# Patient Record
Sex: MALE | Race: WHITE | Employment: FULL TIME | URBAN - METROPOLITAN AREA
[De-identification: names, ages, dates, MRNs, and addresses within clinical notes are randomized per-mention and may not be internally consistent; named-entity substitution may affect disease eponyms.]

---

## 2015-06-02 LAB — COLONOSCOPY, EXTERNAL: NORMAL

## 2017-02-07 ENCOUNTER — HOSPITAL ENCOUNTER (OUTPATIENT)
Dept: LAB | Age: 52
Discharge: HOME | End: 2017-02-07
Payer: COMMERCIAL

## 2017-02-07 ENCOUNTER — OFFICE VISIT (OUTPATIENT)
Dept: INTERNAL MEDICINE CLINIC | Age: 52
End: 2017-02-07

## 2017-02-07 VITALS
WEIGHT: 195.8 LBS | DIASTOLIC BLOOD PRESSURE: 76 MMHG | BODY MASS INDEX: 29.67 KG/M2 | TEMPERATURE: 96.3 F | SYSTOLIC BLOOD PRESSURE: 137 MMHG | RESPIRATION RATE: 16 BRPM | HEART RATE: 71 BPM | HEIGHT: 68 IN | OXYGEN SATURATION: 100 %

## 2017-02-07 DIAGNOSIS — E55.9 VITAMIN D DEFICIENCY: ICD-10-CM

## 2017-02-07 DIAGNOSIS — Z79.899 ENCOUNTER FOR MONITORING DIGOXIN THERAPY: ICD-10-CM

## 2017-02-07 DIAGNOSIS — E11.9 DIABETES MELLITUS, STABLE (HCC): ICD-10-CM

## 2017-02-07 DIAGNOSIS — Z51.81 ENCOUNTER FOR MONITORING DIGOXIN THERAPY: ICD-10-CM

## 2017-02-07 DIAGNOSIS — E11.9 DIABETES MELLITUS, STABLE (HCC): Primary | ICD-10-CM

## 2017-02-07 DIAGNOSIS — I48.91 ATRIAL FIBRILLATION, UNSPECIFIED TYPE (HCC): ICD-10-CM

## 2017-02-07 LAB
25(OH)D3 SERPL-MCNC: 28 NG/ML (ref 30–100)
ALBUMIN SERPL BCP-MCNC: 3.9 G/DL (ref 3.4–5)
ALBUMIN/GLOB SERPL: 1.2 {RATIO} (ref 0.8–1.7)
ALP SERPL-CCNC: 115 U/L (ref 45–117)
ALT SERPL-CCNC: 22 U/L (ref 16–61)
ANION GAP BLD CALC-SCNC: 9 MMOL/L (ref 3–18)
AST SERPL W P-5'-P-CCNC: 12 U/L (ref 15–37)
BILIRUB SERPL-MCNC: 0.5 MG/DL (ref 0.2–1)
BUN SERPL-MCNC: 23 MG/DL (ref 7–18)
BUN/CREAT SERPL: 19 (ref 12–20)
CALCIUM SERPL-MCNC: 9.2 MG/DL (ref 8.5–10.1)
CHLORIDE SERPL-SCNC: 103 MMOL/L (ref 100–108)
CHOLEST SERPL-MCNC: 140 MG/DL
CO2 SERPL-SCNC: 28 MMOL/L (ref 21–32)
CREAT SERPL-MCNC: 1.21 MG/DL (ref 0.6–1.3)
DIGOXIN SERPL-MCNC: 1.3 NG/ML (ref 0.9–2)
EST. AVERAGE GLUCOSE BLD GHB EST-MCNC: 120 MG/DL
GLOBULIN SER CALC-MCNC: 3.3 G/DL (ref 2–4)
GLUCOSE SERPL-MCNC: 145 MG/DL (ref 74–99)
HBA1C MFR BLD: 5.8 % (ref 4.2–5.6)
HDLC SERPL-MCNC: 47 MG/DL (ref 40–60)
HDLC SERPL: 3 {RATIO} (ref 0–5)
LDLC SERPL CALC-MCNC: 82.6 MG/DL (ref 0–100)
LIPID PROFILE,FLP: NORMAL
POTASSIUM SERPL-SCNC: 4.4 MMOL/L (ref 3.5–5.5)
PROT SERPL-MCNC: 7.2 G/DL (ref 6.4–8.2)
SODIUM SERPL-SCNC: 140 MMOL/L (ref 136–145)
TRIGL SERPL-MCNC: 52 MG/DL (ref ?–150)
VLDLC SERPL CALC-MCNC: 10.4 MG/DL

## 2017-02-07 PROCEDURE — 82306 VITAMIN D 25 HYDROXY: CPT | Performed by: INTERNAL MEDICINE

## 2017-02-07 PROCEDURE — 36415 COLL VENOUS BLD VENIPUNCTURE: CPT | Performed by: INTERNAL MEDICINE

## 2017-02-07 PROCEDURE — 83036 HEMOGLOBIN GLYCOSYLATED A1C: CPT | Performed by: INTERNAL MEDICINE

## 2017-02-07 PROCEDURE — 80162 ASSAY OF DIGOXIN TOTAL: CPT | Performed by: INTERNAL MEDICINE

## 2017-02-07 PROCEDURE — 80053 COMPREHEN METABOLIC PANEL: CPT | Performed by: INTERNAL MEDICINE

## 2017-02-07 PROCEDURE — 80061 LIPID PANEL: CPT | Performed by: INTERNAL MEDICINE

## 2017-02-07 NOTE — PROGRESS NOTES
ROOM # 54 Paige Hampton presents today for   Chief Complaint   Patient presents with    Diabetes     f/u       Julian Hamilton preferred language for health care discussion is english/other. Is someone accompanying this pt? no    Is the patient using any DME equipment during [address]? no    Depression Screening:  PHQ 2 / 9, over the last two weeks 2/7/2017 6/30/2016   Little interest or pleasure in doing things Not at all Not at all   Feeling down, depressed or hopeless Not at all Not at all   Total Score PHQ 2 0 0       Learning Assessment:  Learning Assessment 6/30/2016   PRIMARY LEARNER Patient   PRIMARY LANGUAGE ENGLISH   LEARNER PREFERENCE PRIMARY DEMONSTRATION     READING   ANSWERED BY patient   RELATIONSHIP SELF       Abuse Screening:  No flowsheet data found. Fall Risk  No flowsheet data found. Health Maintenance reviewed and discussed per provider. Yes    Julian Hamilton is due for eye exam.  Please order/place referral if appropriate. Advance Directive:  1. Do you have an advance directive in place? Patient Reply: no    2. If not, would you like material regarding how to put one in place? Patient Reply: no    Coordination of Care:  1. Have you been to the ER, urgent care clinic since your last visit? Hospitalized since your last visit? no    2. Have you seen or consulted any other health care providers outside of the [address] [place] [address] since your last visit? Include any pap smears or colon screening.  no

## 2017-02-07 NOTE — PROGRESS NOTES
Chief Complaint   Patient presents with    Diabetes     f/u       HPI:     Elvira Grant is a 46 y.o.  male with history of atrial fibrillation and type 2 DM   here for the above complaint. He has not been checking his sugars because he is taking his medication. Told him to check at least once a week. He denies any chest pain, shortness of breath, abdominal pain, headaches or dizziness. Past Medical History   Diagnosis Date    [facility]     Diabetes mellitus ([facility]     H/O ETOH abuse     [facility]      from DM    Vitamin D deficiency 7/2016     Past Surgical History   Procedure Laterality Date    Hx nephrectomy Left 05/2016     Dr. Isa Shearer     Current Outpatient Prescriptions   Medication Sig    metFORMIN (GLUCOPHAGE) 1,000 mg tablet take 1 tablet by mouth twice a day after meals    metoprolol tartrate (LOPRESSOR) 25 mg tablet TAKR 1/2 TABLETS BY MOUTH TWICE A DAY    digoxin (DIGITEK) 0.125 mg tablet Take 0.125 mg by mouth daily.  omega-3 fatty acids cap Take 500 mg by mouth daily.  OTHER [facility] vitamins daily    Blood-Glucose Meter (FREESTYLE LITE METER) monitoring kit Check fasting before breakfast and dinner.  Lancets misc Check fasting before breakfast and dinner.  glucose blood VI test strips (FREESTYLE LITE STRIPS) strip Check fasting before breakfast and dinner. No current facility-administered medications for this visit.       Health Maintenance   Topic Date Due    EYE EXAM RETINAL OR DILATED Q1  03/12/1973    HEMOGLOBIN A1C Q6M  05/07/2017    MICROALBUMIN Q1  07/12/2017    FOOT EXAM Q1  11/07/2017    LIPID PANEL Q1  11/07/2017    FOBT Q 1 YEAR AGE 50-75  11/07/2017    DTaP/Tdap/Td series (2 - Td) 07/21/2026    Hepatitis C Screening  Addressed    Pneumococcal 19-64 Medium Risk  Addressed    INFLUENZA AGE 9 TO ADULT  Addressed     Immunization History   Administered Date(s) Administered    Tdap 07/21/2016     No LMP for male patient. Allergies and Intolerances: Allergies   Allergen Reactions    Pcn [Penicillins] Unable to Obtain     child       Family History:   Family History   Problem Relation Age of Onset    Cancer Mother      lung cancer    Cancer Father      brain and liver ca       Social History:   He  reports that he has never smoked. He has never used smokeless tobacco.  He  reports that he drinks alcohol. Objective:   Physical exam:   Visit Vitals    /76 (BP 1 Location: Left arm, BP Patient Position: Sitting)    Pulse 71    Temp 96.3 °F (35.7 °C) (Oral)    Resp 16    Ht 5' 8\" (1.727 m)    Wt 195 lb 12.8 oz (88.8 kg)    SpO2 100%    BMI 29.77 kg/m2        Generally: Pleasant male in no acute distress  Cardiac Exam: regular, rate, and rhythm. Normal S1 and S2. No murmurs, gallops, or rubs  Pulmonary exam: Clear to auscultation bilaterally  Abdominal exam: Positive bowel sounds in all four quadrants, soft, nondistended, nontender  Extremities: 2+ dorsalis pedis pulses bilaterally. No pedal edema    bilaterally    LABS/Radiological Tests:  Lab Results   Component Value Date/Time    WBC 5.9 07/12/2016 08:19 AM    HGB 11.3 07/12/2016 08:19 AM    HCT 35.9 07/12/2016 08:19 AM    PLATELET 407 82/64/0571 08:19 AM     Lab Results   Component Value Date/Time    Sodium 140 11/07/2016 08:12 AM    Potassium 4.7 11/07/2016 08:12 AM    Chloride 103 11/07/2016 08:12 AM    CO2 27 11/07/2016 08:12 AM    Glucose 113 11/07/2016 08:12 AM    BUN 18 11/07/2016 08:12 AM    Creatinine 1.19 11/07/2016 08:12 AM     Lab Results   Component Value Date/Time    Cholesterol, total 160 11/07/2016 08:12 AM    HDL Cholesterol 47 11/07/2016 08:12 AM    LDL, calculated 97.6 11/07/2016 08:12 AM    Triglyceride 77 11/07/2016 08:12 AM     No results found for: GPT    Previous labs      ASSESSMENT/PLAN:    1. Diabetes mellitus, stable ([facility]): we will see what the labs show. Continue the metformin, diet and exercise.    - METABOLIC PANEL, COMPREHENSIVE; Future  -     LIPID PANEL; Future  -     HEMOGLOBIN A1C WITH EAG; Future    2. Vitamin D deficiency  -     VITAMIN D, 25 HYDROXY; Future    3. Encounter for monitoring digoxin therapy  -     DIGOXIN; Future    4. Atrial fibrillation, unspecified type ([facility] stable. Continue the digoxin and metoprolol, diet and exercise. 5. Patient verbalized understanding and agreement with the plan. 6. Patient was given an after-visit summary. 7.   Follow-up Disposition:  Return in about 4 months (around 6/7/2017) for f/u DM. or sooner if worsening symptoms.                 Wilda Kimball MD

## 2017-02-07 NOTE — MR AVS SNAPSHOT
Visit Information Date & Time Provider Department Dept. Phone Encounter #  
 2/7/2017  7:30 AM Rehan Galeano MD [facility] [phone] [id number] Follow-up Instructions Return in about 4 months (around 6/7/2017) for f/u DM. Upcoming Health Maintenance Date Due  
 EYE EXAM RETINAL OR DILATED Q1 3/12/1973 HEMOGLOBIN A1C Q6M 5/7/2017 MICROALBUMIN Q1 7/12/2017 FOOT EXAM Q1 11/7/2017 LIPID PANEL Q1 11/7/2017 FOBT Q 1 YEAR AGE 50-75 11/7/2017 DTaP/Tdap/Td series (2 - Td) 7/21/2026 Allergies as of 2/7/2017  Review Complete On: 2/7/2017 By: Rehan Galeano MD  
  
 Severity Noted Reaction Type Reactions Pcn [Penicillins]  06/15/2010    Unable to Obtain  
 child Current Immunizations  Reviewed on 7/21/2016 Name Date Tdap 7/21/2016  3:12 PM  
  
 Not reviewed this visit You Were Diagnosed With   
  
 Codes Comments Diabetes mellitus, stable ([facility]    -  Primary ICD-10-CM: E11.9 ICD-9-CM: 250.00 Vitamin D deficiency     ICD-10-CM: E55.9 ICD-9-CM: 268.9 Encounter for monitoring digoxin therapy     ICD-10-CM: Z51.81, Z79.899 ICD-9-CM: V58.83, V58.69 Vitals BP Pulse Temp Resp Height(growth percentile) Weight(growth percentile)  
 137/76 (BP 1 Location: Left arm, BP Patient Position: Sitting) 71 96.3 °F (35.7 °C) (Oral) 16 5' 8\" (1.727 m) 195 lb 12.8 oz (88.8 kg) SpO2 BMI Smoking Status 100% 29.77 kg/m2 Never Smoker Vitals History BMI and BSA Data Body Mass Index Body Surface Area  
 29.77 kg/m 2 2.06 m 2 Preferred Pharmacy Pharmacy Name Phone [facility] [address]  [facility] [phone] Your Updated Medication List  
  
   
This list is accurate as of: 2/7/17  8:31 AM.  Always use your most recent med list.  
  
  
  
  
 Blood-Glucose Meter monitoring kit Commonly known as:  FREESTYLE LITE METER  
 Check fasting before breakfast and dinner. DIGITEK 0.125 mg tablet Generic drug:  digoxin Take 0.125 mg by mouth daily. glucose blood VI test strips strip Commonly known as:  FREESTYLE LITE STRIPS Check fasting before breakfast and dinner. Lancets Misc Check fasting before breakfast and dinner. metFORMIN 1,000 mg tablet Commonly known as:  GLUCOPHAGE  
take 1 tablet by mouth twice a day after meals  
  
 metoprolol tartrate 25 mg tablet Commonly known as:  LOPRESSOR  
TAKR 1/2 TABLETS BY MOUTH TWICE A DAY  
  
 omega-3 fatty acids Cap Take 500 mg by mouth daily. OTHER  
shakley vitamins daily Follow-up Instructions Return in about 4 months (around 6/7/2017) for f/u DM. To-Do List   
 02/07/2017 Lab:  DIGOXIN   
  
 02/07/2017 Lab:  HEMOGLOBIN A1C WITH EAG   
  
 02/07/2017 Lab:  LIPID PANEL   
  
 02/07/2017 Lab:  METABOLIC PANEL, COMPREHENSIVE   
  
 02/07/2017 Lab:  VITAMIN D, 25 HYDROXY Patient Instructions 1) check fasting sugars once a week (prior to breakfast) 2) follow-up in 4 months or sooner if worsening symptoms. Introducing [facility]! [employer] introduces [employer] patient portal. Now you can access parts of your medical record, email your doctor's office, and request medication refills online. 1. In your internet browser, go to [email or web address] 2. Click on the First Time User? Click Here link in the Sign In box. You will see the New Member Sign Up page. 3. Enter your [employer] Access Code exactly as it appears below. You will not need to use this code after youve completed the sign-up process. If you do not sign up before the expiration date, you must request a new code. · [employer] Access Code: [email or web address] Expires: 5/7/2017  8:40 AM 
 
4.  Enter the last four digits of your Social Security Number (xxxx) and Date of Birth (mm/dd/yyyy) as indicated and click Submit. You will be taken to the next sign-up page. 5. Create a bOombate ID. This will be your bO[employer]ate login ID and cannot be changed, so think of one that is secure and easy to remember. 6. Create a bOombate password. You can change your password at any time. 7. Enter your Password Reset Question and Answer. This can be used at a later time if you forget your password. 8. Enter your e-mail address. You will receive e-mail notification when new information is available in [address] 9. Click Sign Up. You can now view and download portions of your medical record. 10. Click the Download Summary menu link to download a portable copy of your medical information. If you have questions, please visit the Frequently Asked Questions section of the [employer] website. Remember, [employer] is NOT to be used for urgent needs. For medical emergencies, dial 911. Now available from your iPhone and Android! Please provide this summary of care documentation to your next provider. Your primary care clinician is listed as Mike Falcon. If you have any questions after today's visit, please call [phone].

## 2017-02-07 NOTE — PATIENT INSTRUCTIONS
1) check fasting sugars once a week (prior to breakfast)    2) follow-up in 4 months or sooner if worsening symptoms.

## 2017-05-01 ENCOUNTER — TELEPHONE (OUTPATIENT)
Dept: INTERNAL MEDICINE CLINIC | Age: 52
End: 2017-05-01

## 2017-05-01 DIAGNOSIS — E11.9 DIABETES MELLITUS, STABLE (HCC): Primary | ICD-10-CM

## 2017-05-01 NOTE — TELEPHONE ENCOUNTER
Is this for DM foot exam ? Or is he having problems with his foot? If issues with his foot, which one.

## 2017-05-03 NOTE — TELEPHONE ENCOUNTER
Attempted to contact pt at  number, no answer. m for pt to return call to office at [phone]. Will continue to try to contact pt.

## 2017-05-04 DIAGNOSIS — T14.8XXA BLOOD BLISTER: Primary | ICD-10-CM

## 2017-05-04 NOTE — TELEPHONE ENCOUNTER
2 pt. Identifiers confirmed. S/w pt. about below. He states he had a blood blister on ball right foot that had healed but reopened w/o infection. He scheduled his own aptmt and was seen at [facility] yesterday. No other questions/concerns at this time.

## 2017-08-07 DIAGNOSIS — E11.9 DIABETES MELLITUS, STABLE (HCC): ICD-10-CM

## 2017-08-07 RX ORDER — LANCETS
EACH MISCELLANEOUS
Qty: 100 EACH | Refills: 11 | Status: SHIPPED | OUTPATIENT
Start: 2017-08-07

## 2017-08-07 NOTE — TELEPHONE ENCOUNTER
Requested Prescriptions     Pending Prescriptions Disp Refills    glucose blood VI test strips (FREESTYLE LITE STRIPS) strip 100 Strip 11     Sig: Check fasting before breakfast and dinner.  Lancets misc 100 Each 11     Sig: Check fasting before breakfast and dinner.

## 2017-08-14 ENCOUNTER — TELEPHONE (OUTPATIENT)
Dept: INTERNAL MEDICINE CLINIC | Age: 52
End: 2017-08-14

## 2017-08-14 ENCOUNTER — HOSPITAL ENCOUNTER (OUTPATIENT)
Dept: LAB | Age: 52
Discharge: HOME | End: 2017-08-14
Payer: COMMERCIAL

## 2017-08-14 ENCOUNTER — OFFICE VISIT (OUTPATIENT)
Dept: INTERNAL MEDICINE CLINIC | Age: 52
End: 2017-08-14

## 2017-08-14 VITALS
BODY MASS INDEX: 30.01 KG/M2 | HEIGHT: 68 IN | TEMPERATURE: 97.6 F | HEART RATE: 64 BPM | RESPIRATION RATE: 18 BRPM | DIASTOLIC BLOOD PRESSURE: 76 MMHG | WEIGHT: 198 LBS | SYSTOLIC BLOOD PRESSURE: 131 MMHG | OXYGEN SATURATION: 99 %

## 2017-08-14 DIAGNOSIS — N39.0 URINARY TRACT INFECTION WITHOUT HEMATURIA, SITE UNSPECIFIED: Primary | ICD-10-CM

## 2017-08-14 DIAGNOSIS — Z79.899 ENCOUNTER FOR MONITORING DIGOXIN THERAPY: ICD-10-CM

## 2017-08-14 DIAGNOSIS — Z12.5 SCREENING PSA (PROSTATE SPECIFIC ANTIGEN): ICD-10-CM

## 2017-08-14 DIAGNOSIS — Z51.81 ENCOUNTER FOR MONITORING DIGOXIN THERAPY: ICD-10-CM

## 2017-08-14 DIAGNOSIS — I48.91 ATRIAL FIBRILLATION, UNSPECIFIED TYPE (HCC): ICD-10-CM

## 2017-08-14 DIAGNOSIS — I10 BENIGN HYPERTENSION WITHOUT CHF: ICD-10-CM

## 2017-08-14 DIAGNOSIS — E55.9 VITAMIN D DEFICIENCY: ICD-10-CM

## 2017-08-14 DIAGNOSIS — E11.9 DIABETES MELLITUS, STABLE (HCC): ICD-10-CM

## 2017-08-14 DIAGNOSIS — E11.9 DIABETES MELLITUS, STABLE (HCC): Primary | ICD-10-CM

## 2017-08-14 LAB
25(OH)D3 SERPL-MCNC: 30.2 NG/ML (ref 30–100)
ALBUMIN SERPL BCP-MCNC: 3.8 G/DL (ref 3.4–5)
ALBUMIN/GLOB SERPL: 1.1 {RATIO} (ref 0.8–1.7)
ALP SERPL-CCNC: 103 U/L (ref 45–117)
ALT SERPL-CCNC: 14 U/L (ref 16–61)
ANION GAP BLD CALC-SCNC: 7 MMOL/L (ref 3–18)
APPEARANCE UR: CLEAR
AST SERPL W P-5'-P-CCNC: 8 U/L (ref 15–37)
BACTERIA URNS QL MICRO: ABNORMAL /HPF
BILIRUB SERPL-MCNC: 0.4 MG/DL (ref 0.2–1)
BILIRUB UR QL: NEGATIVE
BUN SERPL-MCNC: 21 MG/DL (ref 7–18)
BUN/CREAT SERPL: 18 (ref 12–20)
CALCIUM SERPL-MCNC: 8.7 MG/DL (ref 8.5–10.1)
CHLORIDE SERPL-SCNC: 102 MMOL/L (ref 100–108)
CHOLEST SERPL-MCNC: 152 MG/DL
CO2 SERPL-SCNC: 28 MMOL/L (ref 21–32)
COLOR UR: YELLOW
CREAT SERPL-MCNC: 1.16 MG/DL (ref 0.6–1.3)
CREAT UR-MCNC: 87.29 MG/DL (ref 30–125)
DIGOXIN SERPL-MCNC: 1.6 NG/ML (ref 0.9–2)
EPITH CASTS URNS QL MICRO: ABNORMAL /LPF (ref 0–5)
ERYTHROCYTE [DISTWIDTH] IN BLOOD BY AUTOMATED COUNT: 13.8 % (ref 11.6–14.5)
GLOBULIN SER CALC-MCNC: 3.6 G/DL (ref 2–4)
GLUCOSE SERPL-MCNC: 120 MG/DL (ref 74–99)
GLUCOSE UR STRIP.AUTO-MCNC: NEGATIVE MG/DL
HBA1C MFR BLD: 5.9 % (ref 4.2–5.6)
HCT VFR BLD AUTO: 40.4 % (ref 36–48)
HDLC SERPL-MCNC: 43 MG/DL (ref 40–60)
HDLC SERPL: 3.5 {RATIO} (ref 0–5)
HGB BLD-MCNC: 13.1 G/DL (ref 13–16)
HGB UR QL STRIP: NEGATIVE
KETONES UR QL STRIP.AUTO: NEGATIVE MG/DL
LDLC SERPL CALC-MCNC: 97 MG/DL (ref 0–100)
LEUKOCYTE ESTERASE UR QL STRIP.AUTO: ABNORMAL
LIPID PROFILE,FLP: NORMAL
MCH RBC QN AUTO: 29.3 PG (ref 24–34)
MCHC RBC AUTO-ENTMCNC: 32.4 G/DL (ref 31–37)
MCV RBC AUTO: 90.4 FL (ref 74–97)
MICROALBUMIN UR-MCNC: 3.2 MG/DL (ref 0–3)
MICROALBUMIN/CREAT UR-RTO: 37 MG/G (ref 0–30)
NITRITE UR QL STRIP.AUTO: POSITIVE
PH UR STRIP: 5.5 [PH] (ref 5–8)
PLATELET # BLD AUTO: 197 K/UL (ref 135–420)
PMV BLD AUTO: 11 FL (ref 9.2–11.8)
POTASSIUM SERPL-SCNC: 4.7 MMOL/L (ref 3.5–5.5)
PROT SERPL-MCNC: 7.4 G/DL (ref 6.4–8.2)
PROT UR STRIP-MCNC: NEGATIVE MG/DL
PSA SERPL-MCNC: 0.9 NG/ML (ref 0–4)
RBC # BLD AUTO: 4.47 M/UL (ref 4.7–5.5)
RBC #/AREA URNS HPF: 0 /HPF (ref 0–5)
SODIUM SERPL-SCNC: 137 MMOL/L (ref 136–145)
SP GR UR REFRACTOMETRY: 1.02 (ref 1–1.03)
TRIGL SERPL-MCNC: 60 MG/DL (ref ?–150)
TSH SERPL DL<=0.05 MIU/L-ACNC: 2.69 UIU/ML (ref 0.36–3.74)
UROBILINOGEN UR QL STRIP.AUTO: 0.2 EU/DL (ref 0.2–1)
VLDLC SERPL CALC-MCNC: 12 MG/DL
WBC # BLD AUTO: 7 K/UL (ref 4.6–13.2)
WBC URNS QL MICRO: ABNORMAL /HPF (ref 0–4)

## 2017-08-14 PROCEDURE — 85027 COMPLETE CBC AUTOMATED: CPT | Performed by: INTERNAL MEDICINE

## 2017-08-14 PROCEDURE — 80061 LIPID PANEL: CPT | Performed by: INTERNAL MEDICINE

## 2017-08-14 PROCEDURE — 80162 ASSAY OF DIGOXIN TOTAL: CPT | Performed by: INTERNAL MEDICINE

## 2017-08-14 PROCEDURE — 83036 HEMOGLOBIN GLYCOSYLATED A1C: CPT | Performed by: INTERNAL MEDICINE

## 2017-08-14 PROCEDURE — 82043 UR ALBUMIN QUANTITATIVE: CPT | Performed by: INTERNAL MEDICINE

## 2017-08-14 PROCEDURE — 81001 URINALYSIS AUTO W/SCOPE: CPT | Performed by: INTERNAL MEDICINE

## 2017-08-14 PROCEDURE — 36415 COLL VENOUS BLD VENIPUNCTURE: CPT | Performed by: INTERNAL MEDICINE

## 2017-08-14 PROCEDURE — 84443 ASSAY THYROID STIM HORMONE: CPT | Performed by: INTERNAL MEDICINE

## 2017-08-14 PROCEDURE — 82306 VITAMIN D 25 HYDROXY: CPT | Performed by: INTERNAL MEDICINE

## 2017-08-14 PROCEDURE — 84153 ASSAY OF PSA TOTAL: CPT | Performed by: INTERNAL MEDICINE

## 2017-08-14 PROCEDURE — 80053 COMPREHEN METABOLIC PANEL: CPT | Performed by: INTERNAL MEDICINE

## 2017-08-14 RX ORDER — METFORMIN HYDROCHLORIDE 1000 MG/1
TABLET ORAL
Qty: 180 TAB | Refills: 3 | Status: SHIPPED | OUTPATIENT
Start: 2017-08-14 | End: 2018-04-23 | Stop reason: SDUPTHER

## 2017-08-14 RX ORDER — IODOQUINOL, HYDROCORTISONE ACETATE AND ALOE VERA LEAF 10; 20; 10 MG/G; MG/G; MG/G
GEL TOPICAL
Refills: 2 | COMMUNITY
Start: 2017-07-05 | End: 2017-08-14

## 2017-08-14 RX ORDER — DIGOXIN 125 MCG
0.12 TABLET ORAL DAILY
Qty: 90 TAB | Refills: 3 | Status: SHIPPED | OUTPATIENT
Start: 2017-08-14 | End: 2018-09-10 | Stop reason: SDUPTHER

## 2017-08-14 RX ORDER — CIPROFLOXACIN 500 MG/1
500 TABLET ORAL 2 TIMES DAILY
Qty: 20 TAB | Refills: 0 | Status: SHIPPED | OUTPATIENT
Start: 2017-08-14 | End: 2017-08-24

## 2017-08-14 RX ORDER — METOPROLOL TARTRATE 25 MG/1
TABLET, FILM COATED ORAL
Qty: 90 TAB | Refills: 3 | Status: SHIPPED | OUTPATIENT
Start: 2017-08-14 | End: 2018-09-11 | Stop reason: SDUPTHER

## 2017-08-14 NOTE — TELEPHONE ENCOUNTER
2 pt. Identifiers confirmed. Pt. Stated he received a call. No documented reason for call. Call was not from provider and pt. Denies any questions/concerns at this time. This encounter to be closed.

## 2017-08-14 NOTE — MR AVS SNAPSHOT
Visit Information Date & Time Provider Department Dept. Phone Encounter #  
 8/14/2017  8:30 AM Yolanda Wong MD [address] [place] [address] [phone] [id number] Follow-up Instructions Return in about 6 months (around 2/14/2018) for f/u DM/HTN. Upcoming Health Maintenance Date Due INFLUENZA AGE 9 TO ADULT 9/15/2017* EYE EXAM RETINAL OR DILATED Q1 7/20/2018* FOBT Q 1 YEAR AGE 50-75 11/7/2017 LIPID PANEL Q1 2/7/2018 HEMOGLOBIN A1C Q6M 2/14/2018 FOOT EXAM Q1 5/23/2018 MICROALBUMIN Q1 8/14/2018 DTaP/Tdap/Td series (2 - Td) 7/21/2026 *Topic was postponed. The date shown is not the original due date. Allergies as of 8/14/2017  Review Complete On: 8/14/2017 By: Yolanda Wong MD  
  
 Severity Noted Reaction Type Reactions Pcn [Penicillins]  06/15/2010    Unable to Obtain  
 child Current Immunizations  Reviewed on 7/21/2016 Name Date Tdap 7/21/2016  3:12 PM  
  
 Not reviewed this visit You Were Diagnosed With   
  
 Codes Comments Diabetes mellitus, stable ([facility]    -  Primary ICD-10-CM: E11.9 ICD-9-CM: 250.00 Benign hypertension without CHF     ICD-10-CM: I10 
ICD-9-CM: 401.1 Atrial fibrillation, unspecified type ([facility]     ICD-10-CM: I48.91 
ICD-9-CM: 427.31 Vitamin D deficiency     ICD-10-CM: E55.9 ICD-9-CM: 268.9 Screening PSA (prostate specific antigen)     ICD-10-CM: Z12.5 ICD-9-CM: V76.44 Encounter for monitoring digoxin therapy     ICD-10-CM: Z51.81, Z79.899 ICD-9-CM: V58.83, V58.69 Vitals BP Pulse Temp Resp Height(growth percentile) Weight(growth percentile) 131/76 (BP 1 Location: Left arm, BP Patient Position: Sitting) 64 97.6 °F (36.4 °C) (Oral) 18 5' 8\" (1.727 m) 198 lb (89.8 kg) SpO2 BMI Smoking Status 99% 30.11 kg/m2 Never Smoker Vitals History BMI and BSA Data  Body Mass Index Body Surface Area  
 30.11 kg/m 2 2.08 m 2  
  
  
 Preferred Pharmacy Pharmacy Name Phone [facility] Ascenc[facility] [address] [phone] Your Updated Medication List  
  
   
This list is accurate as of: 8/14/17  9:07 AM.  Always use your most recent med list.  
  
  
  
  
 digoxin 0.125 mg tablet Commonly known as:  Carnella [place] Take 1 Tab by mouth daily. glucose blood VI test strips strip Commonly known as:  ASCENSIA AUTODISC VI, ONE TOUCH ULTRA TEST VI Check fasting sugars before breakfast and dinner. One Touch Ultra mini meter Lancets Misc Check fasting before breakfast and dinner. metFORMIN 1,000 mg tablet Commonly known as:  GLUCOPHAGE  
take 1 tablet by mouth twice a day AFTER MEALS  
  
 metoprolol tartrate 25 mg tablet Commonly known as:  LOPRESSOR  
TAKE 1/2 TABLETS BY MOUTH TWICE A DAY  
  
 ONE TOUCH ULTRASMART SYSTEM  
by Does Not Apply route. OTHER  
shakley vitamins daily Prescriptions Sent to Pharmacy Refills  
 metoprolol tartrate (LOPRESSOR) 25 mg tablet 3 Sig: TAKE 1/2 TABLETS BY MOUTH TWICE A DAY Class: Normal  
 Pharmacy: [address] Ph #: [phone]  
 metFORMIN (GLUCOPHAGE) 1,000 mg tablet 3 Sig: take 1 tablet by mouth twice a day AFTER MEALS Class: Normal  
 Pharmacy: [address] Ph #: [phone]  
 digoxin (DIGITEK) 0.125 mg tablet 3 Sig: Take 1 Tab by mouth daily. Class: Normal  
 Pharmacy: [address] Ph #: [phone] Route: Oral  
 glucose blood VI test strips (ASCENSIA AUTODISC VI, ONE TOUCH ULTRA TEST VI) strip 11 Sig: Check fasting sugars before breakfast and dinner. One Touch Ultra mini meter Class: Normal  
 Pharmacy: [address] Ph #: [phone] Follow-up Instructions Return in about 6 months (around 2/14/2018) for f/u DM/HTN. To-Do List   
 08/14/2017 Lab:  CBC W/O DIFF   
  
 08/14/2017 Lab:  DIGOXIN   
  
 08/14/2017 Lab:  HEMOGLOBIN A1C W/O EAG   
  
 08/14/2017 Lab:  LIPID PANEL   
  
 08/14/2017 Lab:  METABOLIC PANEL, COMPREHENSIVE   
  
 08/14/2017 Lab:  MICROALBUMIN, UR, RAND W/ MICROALBUMIN/CREA RATIO   
  
 08/14/2017 Lab:  PSA, DIAGNOSTIC (PROSTATE SPECIFIC AG)   
  
 08/14/2017 Lab:  TSH 3RD GENERATION   
  
 08/14/2017 Lab:  URINALYSIS W/ RFLX MICROSCOPIC   
  
 08/14/2017 Lab:  VITAMIN D, 25 HYDROXY Patient Instructions 1) follow-up in 6 months or sooner if worsening symptoms. Introducing hospitals & [facility]! [employer] introduces "[employer] patient portal. Now you can access parts of your medical record, email your doctor's office, and request medication refills online. 1. In your internet browser, go to [email or web address] 2. Click on the First Time User? Click Here link in the Sign In box. You will see the New Member Sign Up page. 3. Enter your "[employer] Access Code exactly as it appears below. You will not need to use this code after youve completed the sign-up process. If you do not sign up before the expiration date, you must request a new code. · "[employer] Access Code: [email or web address] Expires: 11/12/2017  8:58 AM 
 
4. Enter the last four digits of your Social Security Number (xxxx) and Date of Birth (mm/dd/yyyy) as indicated and click Submit. You will be taken to the next sign-up page. 5. Create a "[employer] ID. This will be your "[employer] login ID and cannot be changed, so think of one that is secure and easy to remember. 6. Create a "[employer] password. You can change your password at any time. 7. Enter your Password Reset Question and Answer. This can be used at a later time if you forget your password. 8. Enter your e-mail address. You will receive e-mail notification when new information is available in [address] 9. Click Sign Up. You can now view and download portions of your medical record. 10. Click the Download Summary menu link to download a portable copy of your medical information. If you have questions, please visit the Frequently Asked Questions section of the [employer] website. Remember, [employer] is NOT to be used for urgent needs. For medical emergencies, dial 911. Now available from your iPhone and Android! Please provide this summary of care documentation to your next provider. Your primary care clinician is listed as Mike Falcon. If you have any questions after today's visit, please call [phone].

## 2017-08-14 NOTE — PROGRESS NOTES
Chief Complaint   Patient presents with    Hypertension     4 month f/u    Diabetes     4 month f/u    Medication Refill       HPI:     Luis Eduardo Silverman is a 45 y.o.  male with history of  Type 2 DM and hypertension  here for the above complaint. She denies any chest pain, shortness of breath, abdominal pain, headaches or dizziness. Sugars are doing good. He is using one touch ultra mini meter. He checks his sugars daily before breakfast & dinner. He does not want to have a CPE. Past Medical History:   Diagnosis Date    A-fib ([address]     Diabetes mellitus ([address]     H/O ETOH abuse     Peripheral neuropathy ([address]     from DM    Vitamin D deficiency 7/2016     Past Surgical History:   Procedure Laterality Date    HX NEPHRECTOMY Left 05/2016    Dr. Coffman Cuff     Current Outpatient Prescriptions   Medication Tl Juarez ([address]) by Does Not Apply route.  metoprolol tartrate (LOPRESSOR) 25 mg tablet TAKE 1/2 TABLETS BY MOUTH TWICE A DAY    metFORMIN (GLUCOPHAGE) 1,000 mg tablet take 1 tablet by mouth twice a day AFTER MEALS    digoxin (DIGITEK) 0.125 mg tablet Take 1 Tab by mouth daily.  glucose blood VI test strips (ASCENSIA AUTODISC VI, ONE TOUCH ULTRA TEST VI) strip Check fasting sugars before breakfast and dinner. One Touch Ultra mini meter    Lancets misc Check fasting before breakfast and dinner.  OTHER shakley vitamins daily     No current facility-administered medications for this visit.       Health Maintenance   Topic Date Due    INFLUENZA AGE 9 TO ADULT  09/15/2017 (Originally 8/1/2017)    EYE EXAM RETINAL OR DILATED Q1  07/20/2018 (Originally 3/12/1975)    FOBT Q 1 YEAR AGE 50-75  11/07/2017    LIPID PANEL Q1  02/07/2018    HEMOGLOBIN A1C Q6M  02/14/2018    FOOT EXAM Q1  05/23/2018    MICROALBUMIN Q1  08/14/2018    DTaP/Tdap/Td series (2 - Td) 07/21/2026    Hepatitis C Screening  Addressed    Pneumococcal 19-64 Medium Risk Addressed     Immunization History   Administered Date(s) Administered    Tdap 07/21/2016     No LMP for male patient. Allergies and Intolerances: Allergies   Allergen Reactions    Pcn [Penicillins] Unable to Obtain     child       Family History:   Family History   Problem Relation Age of Onset    Cancer Mother      lung cancer    Cancer Father      brain and liver ca       Social History:   He  reports that he has never smoked. He has never used smokeless tobacco.  He  reports that he drinks alcohol. Objective:   Physical exam:   Visit Vitals    /76 (BP 1 Location: Left arm, BP Patient Position: Sitting)    Pulse 64    Temp 97.6 °F (36.4 °C) (Oral)    Resp 18    Ht 5' 8\" (1.727 m)    Wt 198 lb (89.8 kg)    SpO2 99%    BMI 30.11 kg/m2        Generally: Pleasant male in no acute distress  Cardiac Exam: regular, rate, and rhythm. Normal S1 and S2. No murmurs, gallops, or rubs  Pulmonary exam: Clear to auscultation bilaterally  Abdominal exam: Positive bowel sounds in all four quadrants, soft, nondistended, nontender  Extremities: 2+ dorsalis pedis pulses bilaterally.  No pedal edema    bilaterally    LABS/Radiological Tests:  Lab Results   Component Value Date/Time    WBC 5.9 07/12/2016 08:19 AM    HGB 11.3 07/12/2016 08:19 AM    HCT 35.9 07/12/2016 08:19 AM    PLATELET 804 08/78/3620 08:19 AM     Lab Results   Component Value Date/Time    Sodium 140 02/07/2017 08:52 AM    Potassium 4.4 02/07/2017 08:52 AM    Chloride 103 02/07/2017 08:52 AM    CO2 28 02/07/2017 08:52 AM    Glucose 145 02/07/2017 08:52 AM    BUN 23 02/07/2017 08:52 AM    Creatinine 1.21 02/07/2017 08:52 AM     Lab Results   Component Value Date/Time    Cholesterol, total 140 02/07/2017 08:52 AM    HDL Cholesterol 47 02/07/2017 08:52 AM    LDL, calculated 82.6 02/07/2017 08:52 AM    Triglyceride 52 02/07/2017 08:52 AM     No results found for: GPT  Component      Latest Ref Rng & Units 2/7/2017 2/7/2017 2/7/2017 8:52 AM  8:52 AM  8:52 AM   Hemoglobin A1c, (calculated)      4.2 - 5.6 %   5.8 (H)   Est. average glucose      mg/dL   120   Vitamin D 25-Hydroxy      30 - 100 ng/mL 28.0 (L)     DIGOXIN      0.9 - 2.0 NG/ML  1.3    Previous labs      ASSESSMENT/PLAN:    1. Diabetes mellitus, stable ([facility]): we will see what the labs show. Last HgA1c in 2/2017 was 5.8. Continue the metformin, diet and exercise.   -     HEMOGLOBIN A1C W/O EAG; Future  -     LIPID PANEL; Future  -     MICROALBUMIN, UR, RAND W/ MICROALBUMIN/CREA RATIO; Future  -     metFORMIN (GLUCOPHAGE) 1,000 mg tablet; take 1 tablet by mouth twice a day AFTER MEALS  -     TSH 3RD GENERATION; Future  -     glucose blood VI test strips (ASCENSIA AUTODISC VI, ONE TOUCH ULTRA TEST VI) strip; Check fasting sugars before breakfast and dinner. One Touch Ultra mini meter    2. Benign hypertension without CHF: stable. Continue the lopressor, diet and exercise. -     metoprolol tartrate (LOPRESSOR) 25 mg tablet; TAKE 1/2 TABLETS BY MOUTH TWICE A DAY  -     METABOLIC PANEL, COMPREHENSIVE; Future  -     CBC W/O DIFF; Future  -     URINALYSIS W/ RFLX MICROSCOPIC; Future    3. Atrial fibrillation, unspecified type ([facility]): stable. Continue the digoxin and lopressor. 4. Vitamin D deficiency  -     VITAMIN D, 25 HYDROXY; Future    5. Screening PSA (prostate specific antigen)  -     PROSTATE SPECIFIC AG; Future    6. Encounter for monitoring digoxin therapy  -     DIGOXIN; Future    7. He has history of low testosterone levels. He does not want to have them rechecked as he said he will not take any medication for the low levels.     8.  Requested Prescriptions     Signed Prescriptions Disp Refills    metoprolol tartrate (LOPRESSOR) 25 mg tablet 90 Tab 3     Sig: TAKE 1/2 TABLETS BY MOUTH TWICE A DAY    metFORMIN (GLUCOPHAGE) 1,000 mg tablet 180 Tab 3     Sig: take 1 tablet by mouth twice a day AFTER MEALS    digoxin (DIGITEK) 0.125 mg tablet 90 Tab 3     Sig: Take 1 Tab by mouth daily.  glucose blood VI test strips (ASCENSIA AUTODISC VI, ONE TOUCH ULTRA TEST VI) strip 100 Strip 11     Sig: Check fasting sugars before breakfast and dinner. One Touch Ultra mini meter       9. Patient verbalized understanding and agreement with the plan. 10. Patient was given an after-visit summary. 11.   Follow-up Disposition:  Return in about 6 months (around 2/14/2018) for f/u DM/HTN. or sooner if worsening symptoms.                 Jean Pierre Alicia MD

## 2017-08-14 NOTE — PROGRESS NOTES
ROOM # 1    Yenny Cabrales presents today for   Chief Complaint   Patient presents with    Hypertension     4 month f/u    Diabetes     4 month f/u    Medication Refill     Requested Prescriptions     Pending Prescriptions Disp Refills    metoprolol tartrate (LOPRESSOR) 25 mg tablet 90 Tab 1    metFORMIN (GLUCOPHAGE) 1,000 mg tablet 180 Tab 1    digoxin (DIGITEK) 0.125 mg tablet       Sig: Take 1 Tab by mouth daily. Yenny Cabrales preferred language for health care discussion is english/other. Is someone accompanying this pt? no    Is the patient using any DME equipment during OV? no    Depression Screening:  PHQ over the last two weeks 8/14/2017 2/7/2017 6/30/2016   Little interest or pleasure in doing things Not at all Not at all Not at all   Feeling down, depressed or hopeless Not at all Not at all Not at all   Total Score PHQ 2 0 0 0       Learning Assessment:  Learning Assessment 6/30/2016   PRIMARY LEARNER Patient   PRIMARY LANGUAGE ENGLISH   LEARNER PREFERENCE PRIMARY DEMONSTRATION     READING   ANSWERED BY patient   RELATIONSHIP SELF       Abuse Screening:  No flowsheet data found. Fall Risk  No flowsheet data found. Health Maintenance reviewed and discussed per provider. Yes    Yenny Cabrales is due for A1c, eye exam, microalbumin. Please order/place referral if appropriate. Advance Directive:  1. Do you have an advance directive in place? Patient Reply: no    2. If not, would you like material regarding how to put one in place? Patient Reply: no    Coordination of Care:  1. Have you been to the ER, urgent care clinic since your last visit? Hospitalized since your last visit? no    2. Have you seen or consulted any other health care providers outside of the [address] [place] [address] since your last visit? Include any pap smears or colon screening.  no

## 2017-08-15 ENCOUNTER — LAB ONLY (OUTPATIENT)
Dept: INTERNAL MEDICINE CLINIC | Age: 52
End: 2017-08-15

## 2017-08-15 DIAGNOSIS — N39.0 URINARY TRACT INFECTION WITHOUT HEMATURIA, SITE UNSPECIFIED: Primary | ICD-10-CM

## 2017-08-16 NOTE — TELEPHONE ENCOUNTER
Called pt on 8/14./17 @ 5:25pm and called him about his urine results. They indicate that he might have a UTI. He denies any fevers, chills, nights, dysuria, hematuria, increase urgency/frequency, abd pain, back pain. Due to urine strongly positive for UTI will send electronically:    Requested Prescriptions     Signed Prescriptions Disp Refills    ciprofloxacin HCl (CIPRO) 500 mg tablet 20 Tab 0     Sig: Take 1 Tab by mouth two (2) times a day for 10 days. Authorizing Provider: Lisa Isidro     He has been on this antibiotic before without any problems. He was told that would like to get an urine ctx prior to starting abx. He said he will  rx tonight , but start tomorrow after coming in AM to give urine sample for urine ctx. Order in [facility] care. Pt verbalized understanding.

## 2017-08-22 ENCOUNTER — TELEPHONE (OUTPATIENT)
Dept: INTERNAL MEDICINE CLINIC | Age: 52
End: 2017-08-22

## 2017-08-22 NOTE — TELEPHONE ENCOUNTER
Attempted to contact pt at  number, no answer. Lvm for pt to return call to office at [phone]. Will continue to try to contact pt.

## 2017-08-22 NOTE — TELEPHONE ENCOUNTER
Patient states he presented to clinic for urine culture on Tuesday 8/15/17. Patient would like results from culture.

## 2017-08-23 ENCOUNTER — HOSPITAL ENCOUNTER (OUTPATIENT)
Dept: LAB | Age: 52
Discharge: HOME | End: 2017-08-23
Payer: COMMERCIAL

## 2017-08-23 DIAGNOSIS — N39.0 URINARY TRACT INFECTION WITHOUT HEMATURIA, SITE UNSPECIFIED: ICD-10-CM

## 2017-08-23 PROCEDURE — 87077 CULTURE AEROBIC IDENTIFY: CPT | Performed by: INTERNAL MEDICINE

## 2017-08-23 PROCEDURE — 87186 SC STD MICRODIL/AGAR DIL: CPT | Performed by: INTERNAL MEDICINE

## 2017-08-23 PROCEDURE — 87086 URINE CULTURE/COLONY COUNT: CPT | Performed by: INTERNAL MEDICINE

## 2017-08-23 NOTE — TELEPHONE ENCOUNTER
Patient called us back verified with two identifiers he stated that he was in on 8/15/17 and gave us his urine and was wondering if the results where back. Advised that I didn't see any results but need to call the lab and see what is going on and I would call him back. He said he is taking the antibiotics that where sent. That he didn't get a call back staying that he didn't need to or to continue. Advised to continue and I would call him back.

## 2017-08-23 NOTE — TELEPHONE ENCOUNTER
Called patient back verified with two identifiers advised there was an error at the lab and that we need him to come back and give another sample and that our  here will take care of him her name is isai. Patient verbalized understanding. He will be in today.

## 2017-08-27 ENCOUNTER — TELEPHONE (OUTPATIENT)
Dept: INTERNAL MEDICINE CLINIC | Age: 52
End: 2017-08-27

## 2017-08-27 DIAGNOSIS — N39.0 URINARY TRACT INFECTION WITHOUT HEMATURIA, SITE UNSPECIFIED: Primary | ICD-10-CM

## 2017-08-27 LAB
BACTERIA SPEC CULT: ABNORMAL
BACTERIA SPEC CULT: ABNORMAL
SERVICE CMNT-IMP: ABNORMAL

## 2017-08-27 RX ORDER — SULFAMETHOXAZOLE AND TRIMETHOPRIM 800; 160 MG/1; MG/1
1 TABLET ORAL 2 TIMES DAILY
Qty: 20 TAB | Refills: 0 | Status: SHIPPED | OUTPATIENT
Start: 2017-08-27 | End: 2017-09-06

## 2017-08-27 NOTE — TELEPHONE ENCOUNTER
Please let pt know that urine ctx results were positive for infection and showed that cipro was resistant to antibiotic. Since bacteria still showing in urine ctx despite about 10 days of cipro, sent electronically:      Requested Prescriptions     Signed Prescriptions Disp Refills    trimethoprim-sulfamethoxazole (BACTRIM DS, SEPTRA DS) 160-800 mg per tablet 20 Tab 0     Sig: Take 1 Tab by mouth two (2) times a day for 10 days. Authorizing Provider: Hector Loomis     He will need to come back after abx to give urine and urine ctx to make sure cleared. This antibiotic may increase levels. Will recheck digoxin level in 4-5 days to make sure levels are ok. All orders in [facility] care.

## 2017-08-27 NOTE — TELEPHONE ENCOUNTER
Called pt at [phone] and left message on Vm to call the office back at [phone] on Monday regarding is urine ctx results. Isaac Settle Monday as it is Sunday and office is not open).  Told pt we are open at 7am.

## 2017-08-29 NOTE — TELEPHONE ENCOUNTER
Spoke with patient, verified with 2 identifiers. Advised patient of below. Patient verbalized understanding and stated that he picked up and started antibiotic Sunday afternoon. Patient stated he will be by Friday for labs.

## 2017-09-14 ENCOUNTER — HOSPITAL ENCOUNTER (OUTPATIENT)
Dept: LAB | Age: 52
Discharge: HOME | End: 2017-09-14
Payer: COMMERCIAL

## 2017-09-14 ENCOUNTER — LAB ONLY (OUTPATIENT)
Dept: INTERNAL MEDICINE CLINIC | Age: 52
End: 2017-09-14

## 2017-09-14 DIAGNOSIS — N39.0 URINARY TRACT INFECTION WITHOUT HEMATURIA, SITE UNSPECIFIED: Primary | ICD-10-CM

## 2017-09-14 DIAGNOSIS — N39.0 URINARY TRACT INFECTION WITHOUT HEMATURIA, SITE UNSPECIFIED: ICD-10-CM

## 2017-09-14 LAB
APPEARANCE UR: CLEAR
BILIRUB UR QL: NEGATIVE
COLOR UR: YELLOW
GLUCOSE UR STRIP.AUTO-MCNC: NEGATIVE MG/DL
HGB UR QL STRIP: NEGATIVE
KETONES UR QL STRIP.AUTO: NEGATIVE MG/DL
LEUKOCYTE ESTERASE UR QL STRIP.AUTO: NEGATIVE
NITRITE UR QL STRIP.AUTO: NEGATIVE
PH UR STRIP: 5.5 [PH] (ref 5–8)
PROT UR STRIP-MCNC: NEGATIVE MG/DL
SP GR UR REFRACTOMETRY: 1.01 (ref 1–1.03)
UROBILINOGEN UR QL STRIP.AUTO: 0.2 EU/DL (ref 0.2–1)

## 2017-09-14 PROCEDURE — 81003 URINALYSIS AUTO W/O SCOPE: CPT | Performed by: INTERNAL MEDICINE

## 2017-09-14 PROCEDURE — 87086 URINE CULTURE/COLONY COUNT: CPT | Performed by: INTERNAL MEDICINE

## 2017-09-14 NOTE — PROGRESS NOTES
Result letter generated on 9/14/17 and mailed to pt. Urine ctx pending. We will notify the pt once we get the results.

## 2017-09-16 LAB
BACTERIA SPEC CULT: NORMAL
SERVICE CMNT-IMP: NORMAL

## 2018-04-23 ENCOUNTER — OFFICE VISIT (OUTPATIENT)
Dept: INTERNAL MEDICINE CLINIC | Age: 53
End: 2018-04-23

## 2018-04-23 ENCOUNTER — HOSPITAL ENCOUNTER (OUTPATIENT)
Dept: LAB | Age: 53
Discharge: HOME | End: 2018-04-23
Payer: COMMERCIAL

## 2018-04-23 VITALS
HEART RATE: 58 BPM | WEIGHT: 219.4 LBS | RESPIRATION RATE: 16 BRPM | SYSTOLIC BLOOD PRESSURE: 143 MMHG | BODY MASS INDEX: 33.25 KG/M2 | HEIGHT: 68 IN | TEMPERATURE: 96 F | DIASTOLIC BLOOD PRESSURE: 72 MMHG | OXYGEN SATURATION: 100 %

## 2018-04-23 DIAGNOSIS — E11.9 TYPE 2 DIABETES MELLITUS WITHOUT COMPLICATION, WITHOUT LONG-TERM CURRENT USE OF INSULIN (HCC): Primary | ICD-10-CM

## 2018-04-23 DIAGNOSIS — E66.9 OBESITY (BMI 30.0-34.9): ICD-10-CM

## 2018-04-23 DIAGNOSIS — E11.9 TYPE 2 DIABETES MELLITUS WITHOUT COMPLICATION, WITHOUT LONG-TERM CURRENT USE OF INSULIN (HCC): ICD-10-CM

## 2018-04-23 LAB
ALBUMIN SERPL-MCNC: 4 G/DL (ref 3.4–5)
ALBUMIN/GLOB SERPL: 1.1 {RATIO} (ref 0.8–1.7)
ALP SERPL-CCNC: 108 U/L (ref 45–117)
ALT SERPL-CCNC: 17 U/L (ref 16–61)
ANION GAP SERPL CALC-SCNC: 7 MMOL/L (ref 3–18)
AST SERPL-CCNC: 12 U/L (ref 15–37)
BILIRUB SERPL-MCNC: 0.5 MG/DL (ref 0.2–1)
BUN SERPL-MCNC: 17 MG/DL (ref 7–18)
BUN/CREAT SERPL: 13 (ref 12–20)
CALCIUM SERPL-MCNC: 8.9 MG/DL (ref 8.5–10.1)
CHLORIDE SERPL-SCNC: 107 MMOL/L (ref 100–108)
CHOLEST SERPL-MCNC: 161 MG/DL
CO2 SERPL-SCNC: 26 MMOL/L (ref 21–32)
CREAT SERPL-MCNC: 1.35 MG/DL (ref 0.6–1.3)
GLOBULIN SER CALC-MCNC: 3.7 G/DL (ref 2–4)
GLUCOSE SERPL-MCNC: 117 MG/DL (ref 74–99)
HBA1C MFR BLD HPLC: 6.4 %
HDLC SERPL-MCNC: 39 MG/DL (ref 40–60)
HDLC SERPL: 4.1 {RATIO} (ref 0–5)
LDLC SERPL CALC-MCNC: 110.8 MG/DL (ref 0–100)
LIPID PROFILE,FLP: ABNORMAL
POTASSIUM SERPL-SCNC: 5 MMOL/L (ref 3.5–5.5)
PROT SERPL-MCNC: 7.7 G/DL (ref 6.4–8.2)
SODIUM SERPL-SCNC: 140 MMOL/L (ref 136–145)
TRIGL SERPL-MCNC: 56 MG/DL (ref ?–150)
TSH SERPL DL<=0.05 MIU/L-ACNC: 2.19 UIU/ML (ref 0.36–3.74)
VLDLC SERPL CALC-MCNC: 11.2 MG/DL

## 2018-04-23 PROCEDURE — 84443 ASSAY THYROID STIM HORMONE: CPT | Performed by: FAMILY MEDICINE

## 2018-04-23 PROCEDURE — 80053 COMPREHEN METABOLIC PANEL: CPT | Performed by: FAMILY MEDICINE

## 2018-04-23 PROCEDURE — 80061 LIPID PANEL: CPT | Performed by: FAMILY MEDICINE

## 2018-04-23 RX ORDER — METFORMIN HYDROCHLORIDE 500 MG/1
500 TABLET ORAL 2 TIMES DAILY WITH MEALS
Qty: 60 TAB | Refills: 2 | Status: SHIPPED | OUTPATIENT
Start: 2018-04-23 | End: 2018-09-11 | Stop reason: DRUGHIGH

## 2018-04-23 NOTE — MR AVS SNAPSHOT
[facility] 
 
 
 [address] [facility] [address] 
[phone] Patient: Ashley Hathaway MRN: [id number] :1965 Visit Information Date & Time Provider Department Dept. Phone Encounter #  
 2018  9:45 AM [place] [phone] Follow-up Instructions Return in about 3 months (around 2018) for diabetes. Upcoming Health Maintenance Date Due FOBT Q 1 YEAR AGE 50-75 2017 HEMOGLOBIN A1C Q6M 2018 FOOT EXAM Q1 2018 EYE EXAM RETINAL OR DILATED Q1 2018* Influenza Age 5 to Adult 2018* MICROALBUMIN Q1 2018 LIPID PANEL Q1 2018 DTaP/Tdap/Td series (2 - Td) 2026 *Topic was postponed. The date shown is not the original due date. Allergies as of 2018  Review Complete On: 2018 By: Deidre Mota LPN Severity Noted Reaction Type Reactions Pcn [Penicillins]  06/15/2010    Unable to Obtain  
 child Current Immunizations  Reviewed on 2016 Name Date Tdap 2016  3:12 PM  
  
 Not reviewed this visit You Were Diagnosed With   
  
 Codes Comments Type 2 diabetes mellitus without complication, without long-term current use of insulin (HCC)    -  Primary ICD-10-CM: E11.9 ICD-9-CM: 250.00 Obesity (BMI 30.0-34.9)     ICD-10-CM: H89.9 ICD-9-CM: 278.00 Vitals BP Pulse Temp Resp Height(growth percentile) Weight(growth percentile) 143/72 (BP 1 Location: Left arm, BP Patient Position: Sitting) (!) 58 96 °F (35.6 °C) (Oral) 16 5' 8\" (1.727 m) 219 lb 6.4 oz (99.5 kg) SpO2 BMI Smoking Status 100% 33.36 kg/m2 Never Smoker Vitals History BMI and BSA Data Body Mass Index Body Surface Area  
 33.36 kg/m 2 2.18 m 2 Preferred Pharmacy Pharmacy Name Phone [facility] [address]  [facility] [phone] Your Updated Medication List  
  
   
This list is accurate as of 4/23/18 10:35 AM.  Always use your most recent med list.  
  
  
  
  
 digoxin 0.125 mg tablet Commonly known as:  [place] Take 1 Tab by mouth daily. glucose blood VI test strips strip Commonly known as:  ASCENSIA AUTODISC VI, ONE TOUCH ULTRA TEST VI Check fasting sugars before breakfast and dinner. One Touch Ultra mini meter Lancets Misc Check fasting before breakfast and dinner. metFORMIN 500 mg tablet Commonly known as:  GLUCOPHAGE Take 1 Tab by mouth two (2) times daily (with meals). metoprolol tartrate 25 mg tablet Commonly known as:  LOPRESSOR  
TAKE 1/2 TABLETS BY MOUTH TWICE A DAY  
  
 ONE TOUCH ULTRASMART SYSTEM  
by Does Not Apply route. OTHER  
shakley vitamins daily Prescriptions Sent to Pharmacy Refills  
 metFORMIN (GLUCOPHAGE) 500 mg tablet 2 Sig: Take 1 Tab by mouth two (2) times daily (with meals). Class: Normal  
 Pharmacy: [facility] [address] [place] [address] #: [phone] Route: Oral  
  
We Performed the Following AMB POC HEMOGLOBIN A1C [95589 CPT(R)] Follow-up Instructions Return in about 3 months (around 7/23/2018) for diabetes. To-Do List   
 04/23/2018 Lab:  LIPID PANEL   
  
 04/23/2018 Lab:  METABOLIC PANEL, COMPREHENSIVE   
  
 04/23/2018 Lab:  TSH 3RD GENERATION Introducing [facility]! [facility] introduces [employer] patient portal. Now you can access parts of your medical record, email your doctor's office, and request medication refills online. 1. In your internet browser, go to [email or web address] 2. Click on the First Time User? Click Here link in the Sign In box. You will see the New Member Sign Up page. 3. Enter your [employer] Access Code exactly as it appears below.  You will not need to use this code after youve completed the sign-up process. If you do not sign up before the expiration date, you must request a new code. · [employer] Access Code: [email or web address] Expires: 7/22/2018 10:17 AM 
 
4. Enter the last four digits of your Social Security Number (xxxx) and Date of Birth (mm/dd/yyyy) as indicated and click Submit. You will be taken to the next sign-up page. 5. Create a [employer] ID. This will be your [employer] login ID and cannot be changed, so think of one that is secure and easy to remember. 6. Create a [employer] password. You can change your password at any time. 7. Enter your Password Reset Question and Answer. This can be used at a later time if you forget your password. 8. Enter your e-mail address. You will receive e-mail notification when new information is available in [address] 9. Click Sign Up. You can now view and download portions of your medical record. 10. Click the Download Summary menu link to download a portable copy of your medical information. If you have questions, please visit the Frequently Asked Questions section of the [employer] website. Remember, [employer] is NOT to be used for urgent needs. For medical emergencies, dial 911. Now available from your iPhone and Android! Please provide this summary of care documentation to your next provider. Your primary care clinician is listed as Mike Falcon. If you have any questions after today's visit, please call [phone].

## 2018-04-23 NOTE — PROGRESS NOTES
Oma Mckinley presents today for   Chief Complaint   Patient presents with    Hypertension     6 month f/u    Diabetes     6 month f/u    Medication Refill       Oma Mckinley preferred language for health care discussion is english/other. Is someone accompanying this pt? no    Is the patient using any DME equipment during OV? no    Depression Screening:  PHQ over the last two weeks 4/23/2018 8/14/2017 2/7/2017 6/30/2016   Little interest or pleasure in doing things Not at all Not at all Not at all Not at all   Feeling down, depressed or hopeless Not at all Not at all Not at all Not at all   Total Score PHQ 2 0 0 0 0       Learning Assessment:  Learning Assessment 6/30/2016   PRIMARY LEARNER Patient   PRIMARY LANGUAGE ENGLISH   LEARNER PREFERENCE PRIMARY DEMONSTRATION     READING   ANSWERED BY patient   RELATIONSHIP SELF       Abuse Screening:  No flowsheet data found. Fall Risk  No flowsheet data found. Health Maintenance reviewed and discussed per provider. Yes    Oma Mckinley is due for   Health Maintenance Due   Topic Date Due    FOBT Q 1 YEAR AGE 50-75  11/07/2017    HEMOGLOBIN A1C Q6M  02/14/2018    FOOT EXAM Q1  05/23/2018     Please order/place referral if appropriate. Advance Directive:  1. Do you have an advance directive in place? Patient Reply: no    2. If not, would you like material regarding how to put one in place? Patient Reply: no    Coordination of Care:  1. Have you been to the ER, urgent care clinic since your last visit? Hospitalized since your last visit? no    2. Have you seen or consulted any other health care providers outside of the [facility] since your last visit? Include any pap smears or colon screening.  no

## 2018-04-23 NOTE — PROGRESS NOTES
FAMILY MEDICINE CLINIC NOTE    S: The patient presents for follow up on hypertension and diabtes. He has been adherent with metformin 1000 mg BID    Lab Results   Component Value Date/Time    Hemoglobin A1c 5.9 (H) 08/14/2017 09:12 AM    Hemoglobin A1c (POC) 6.4 04/23/2018 10:29 AM     He has been adherent with metoprolol. States that he has not been getting much consistent exercise and notes that his dietary choices could be better    Denies angina, dyspnea, palpitations, edema or visual changes    Current Outpatient Prescriptions on File Prior to Visit   Medication Sig Dispense Refill    BLOOD-GLUCOSE METER ([address]) by Does Not Apply route.  metoprolol tartrate (LOPRESSOR) 25 mg tablet TAKE 1/2 TABLETS BY MOUTH TWICE A DAY 90 Tab 3    digoxin (DIGITEK) 0.125 mg tablet Take 1 Tab by mouth daily. 90 Tab 3    glucose blood VI test strips (ASCENSIA AUTODISC VI, ONE TOUCH ULTRA TEST VI) strip Check fasting sugars before breakfast and dinner. One Touch Ultra mini meter 100 Strip 11    Lancets misc Check fasting before breakfast and dinner. 100 Each 11    OTHER shakley vitamins daily       No current facility-administered medications on file prior to visit. Past Medical History:   Diagnosis Date    [facility]     Diabetes mellitus ([facility]     H/O ETOH abuse     Peripheral neuropathy     from DM    Vitamin D deficiency 7/2016       Social History     Social History    Marital status:      Spouse name: N/A    Number of children: N/A    Years of education: N/A     Occupational History    Not on file. Social History Main Topics    Smoking status: Never Smoker    Smokeless tobacco: Never Used      Comment: Pt counseled to continue to not smoke.  Alcohol use 0.0 oz/week     0 Standard drinks or equivalent per week      Comment: He has stopped drinking ETOH. He was drinking a bottle of wine everyday.      Drug use: No    Sexual activity: Not on file     Other Topics Concern    Not on file     Social History Narrative       Family History   Problem Relation Age of Onset    Cancer Mother      lung cancer    Cancer Father      brain and liver ca       O:  Visit Vitals    /72 (BP 1 Location: Left arm, BP Patient Position: Sitting)    Pulse (!) 58    Temp 96 °F (35.6 °C) (Oral)    Resp 16    Ht 5' 8\" (1.727 m)    Wt 219 lb 6.4 oz (99.5 kg)    SpO2 100%    BMI 33.36 kg/m2     NAD, comfortable  No thyromegaly  No LAD  RRR, no murmurs  CTABL, no wheezing/ronchi/rales  abd soft ND NT normoactive BS  No edema    HbA1c today 6.4%    52 y.o. male      ICD-10-CM ICD-9-CM    1. Type 2 diabetes mellitus without complication, without long-term current use of insulin (HCC) C63.3 901.45 METABOLIC PANEL, COMPREHENSIVE      AMB POC HEMOGLOBIN A1C      TSH 3RD GENERATION      Decrease metFORMIN (GLUCOPHAGE) to 500 mg tablet BID  Counseled on dietary modification and increased physical activity  Follow up in 3 months   2. Obesity (BMI 30.0-34. 9) X36.0 873.67 METABOLIC PANEL, COMPREHENSIVE      LIPID PANEL      TSH 3RD GENERATION

## 2018-04-26 ENCOUNTER — TELEPHONE (OUTPATIENT)
Dept: INTERNAL MEDICINE CLINIC | Age: 53
End: 2018-04-26

## 2018-04-26 NOTE — TELEPHONE ENCOUNTER
Spoke with patient and 2 patient identifiers was confirmed. Patient was given results below and verbalized understanding . Patient has no questions/concerns  at this time.

## 2018-04-26 NOTE — TELEPHONE ENCOUNTER
----- Message from Kim Sylvester MD sent at 4/26/2018 10:56 AM EDT -----  Please call this patient, inform him that his kidney function is slightly decreased, this is most likely a result of mild dehydration. instruct him to increase his water consumption and to return in 1-2 months to recheck this lab.

## 2018-04-26 NOTE — PROGRESS NOTES
Please call this patient, inform him that his kidney function is slightly decreased, this is most likely a result of mild dehydration. instruct him to increase his water consumption and to return in 1-2 months to recheck this lab.

## 2018-07-12 ENCOUNTER — TELEPHONE (OUTPATIENT)
Dept: INTERNAL MEDICINE CLINIC | Age: 53
End: 2018-07-12

## 2018-07-12 NOTE — LETTER
7/13/2018 11:01 AM 
 
Mr. Ivette Fabian [address] [phone] Dear Mr. Ascencioiann Ok: We have attempted to contact you to schedule an office visit with me for your diabetes, blood clot, respiratory arrest and nutrition. However, the number we have listed on your chart is disconnected. Please call us as soon as possible at [phone] to schedule an appointment. Sincerely, Ranjan Gonzalez M.D.

## 2018-07-12 NOTE — TELEPHONE ENCOUNTER
Rep. With Alex called in to see if we could contact the patient to schedule a F/U for some chronic conditions ( Respitory arrest, heart arhythmia, DVT & Protein calory nutrition). Attempted to contact patient but number listed is Disconnected.

## 2018-09-11 ENCOUNTER — OFFICE VISIT (OUTPATIENT)
Dept: INTERNAL MEDICINE CLINIC | Age: 53
End: 2018-09-11

## 2018-09-11 ENCOUNTER — HOSPITAL ENCOUNTER (OUTPATIENT)
Dept: LAB | Age: 53
Discharge: HOME | End: 2018-09-11
Payer: COMMERCIAL

## 2018-09-11 VITALS
SYSTOLIC BLOOD PRESSURE: 132 MMHG | DIASTOLIC BLOOD PRESSURE: 80 MMHG | HEIGHT: 68 IN | WEIGHT: 218.4 LBS | RESPIRATION RATE: 17 BRPM | TEMPERATURE: 97.3 F | BODY MASS INDEX: 33.1 KG/M2 | OXYGEN SATURATION: 100 % | HEART RATE: 69 BPM

## 2018-09-11 DIAGNOSIS — R82.90 ABNORMAL URINE: ICD-10-CM

## 2018-09-11 DIAGNOSIS — I48.91 ATRIAL FIBRILLATION, UNSPECIFIED TYPE (HCC): ICD-10-CM

## 2018-09-11 DIAGNOSIS — Z12.5 SCREENING PSA (PROSTATE SPECIFIC ANTIGEN): ICD-10-CM

## 2018-09-11 DIAGNOSIS — E11.9 TYPE 2 DIABETES MELLITUS WITHOUT COMPLICATION, WITHOUT LONG-TERM CURRENT USE OF INSULIN (HCC): ICD-10-CM

## 2018-09-11 DIAGNOSIS — E11.9 TYPE 2 DIABETES MELLITUS WITHOUT COMPLICATION, WITHOUT LONG-TERM CURRENT USE OF INSULIN (HCC): Primary | ICD-10-CM

## 2018-09-11 LAB
ALBUMIN SERPL-MCNC: 3.8 G/DL (ref 3.4–5)
ALBUMIN/GLOB SERPL: 1.1 {RATIO} (ref 0.8–1.7)
ALP SERPL-CCNC: 136 U/L (ref 45–117)
ALT SERPL-CCNC: 19 U/L (ref 16–61)
ANION GAP SERPL CALC-SCNC: 9 MMOL/L (ref 3–18)
APPEARANCE UR: CLEAR
AST SERPL-CCNC: 13 U/L (ref 15–37)
BACTERIA URNS QL MICRO: NEGATIVE /HPF
BILIRUB SERPL-MCNC: 0.6 MG/DL (ref 0.2–1)
BILIRUB UR QL: NEGATIVE
BUN SERPL-MCNC: 15 MG/DL (ref 7–18)
BUN/CREAT SERPL: 11 (ref 12–20)
CALCIUM SERPL-MCNC: 8.9 MG/DL (ref 8.5–10.1)
CHLORIDE SERPL-SCNC: 103 MMOL/L (ref 100–108)
CHOLEST SERPL-MCNC: 170 MG/DL
CO2 SERPL-SCNC: 25 MMOL/L (ref 21–32)
COLOR UR: YELLOW
CREAT SERPL-MCNC: 1.31 MG/DL (ref 0.6–1.3)
DIGOXIN SERPL-MCNC: 0.9 NG/ML (ref 0.9–2)
EPITH CASTS URNS QL MICRO: NORMAL /LPF (ref 0–5)
ERYTHROCYTE [DISTWIDTH] IN BLOOD BY AUTOMATED COUNT: 13.5 % (ref 11.6–14.5)
GLOBULIN SER CALC-MCNC: 3.5 G/DL (ref 2–4)
GLUCOSE SERPL-MCNC: 155 MG/DL (ref 74–99)
GLUCOSE UR STRIP.AUTO-MCNC: NEGATIVE MG/DL
HBA1C MFR BLD: 6.7 % (ref 4.2–5.6)
HCT VFR BLD AUTO: 39.5 % (ref 36–48)
HDLC SERPL-MCNC: 32 MG/DL (ref 40–60)
HDLC SERPL: 5.3 {RATIO} (ref 0–5)
HGB BLD-MCNC: 13 G/DL (ref 13–16)
HGB UR QL STRIP: NEGATIVE
KETONES UR QL STRIP.AUTO: NEGATIVE MG/DL
LDLC SERPL CALC-MCNC: 119.6 MG/DL (ref 0–100)
LEUKOCYTE ESTERASE UR QL STRIP.AUTO: NEGATIVE
LIPID PROFILE,FLP: ABNORMAL
MCH RBC QN AUTO: 29 PG (ref 24–34)
MCHC RBC AUTO-ENTMCNC: 32.9 G/DL (ref 31–37)
MCV RBC AUTO: 88 FL (ref 74–97)
NITRITE UR QL STRIP.AUTO: NEGATIVE
PH UR STRIP: 6.5 [PH] (ref 5–8)
PLATELET # BLD AUTO: 220 K/UL (ref 135–420)
PMV BLD AUTO: 10.5 FL (ref 9.2–11.8)
POTASSIUM SERPL-SCNC: 4.5 MMOL/L (ref 3.5–5.5)
PROT SERPL-MCNC: 7.3 G/DL (ref 6.4–8.2)
PROT UR STRIP-MCNC: 30 MG/DL
PSA SERPL-MCNC: 0.4 NG/ML (ref 0–4)
RBC # BLD AUTO: 4.49 M/UL (ref 4.7–5.5)
RBC #/AREA URNS HPF: 0 /HPF (ref 0–5)
SODIUM SERPL-SCNC: 137 MMOL/L (ref 136–145)
SP GR UR REFRACTOMETRY: 1.02 (ref 1–1.03)
TRIGL SERPL-MCNC: 92 MG/DL (ref ?–150)
UROBILINOGEN UR QL STRIP.AUTO: 0.2 EU/DL (ref 0.2–1)
VLDLC SERPL CALC-MCNC: 18.4 MG/DL
WBC # BLD AUTO: 7.8 K/UL (ref 4.6–13.2)
WBC URNS QL MICRO: NORMAL /HPF (ref 0–4)

## 2018-09-11 PROCEDURE — 83036 HEMOGLOBIN GLYCOSYLATED A1C: CPT | Performed by: INTERNAL MEDICINE

## 2018-09-11 PROCEDURE — 80061 LIPID PANEL: CPT | Performed by: INTERNAL MEDICINE

## 2018-09-11 PROCEDURE — 81001 URINALYSIS AUTO W/SCOPE: CPT | Performed by: INTERNAL MEDICINE

## 2018-09-11 PROCEDURE — 85027 COMPLETE CBC AUTOMATED: CPT | Performed by: INTERNAL MEDICINE

## 2018-09-11 PROCEDURE — 87086 URINE CULTURE/COLONY COUNT: CPT | Performed by: INTERNAL MEDICINE

## 2018-09-11 PROCEDURE — 80162 ASSAY OF DIGOXIN TOTAL: CPT | Performed by: INTERNAL MEDICINE

## 2018-09-11 PROCEDURE — 36415 COLL VENOUS BLD VENIPUNCTURE: CPT | Performed by: INTERNAL MEDICINE

## 2018-09-11 PROCEDURE — 82043 UR ALBUMIN QUANTITATIVE: CPT | Performed by: INTERNAL MEDICINE

## 2018-09-11 PROCEDURE — 80053 COMPREHEN METABOLIC PANEL: CPT | Performed by: INTERNAL MEDICINE

## 2018-09-11 PROCEDURE — 84153 ASSAY OF PSA TOTAL: CPT | Performed by: INTERNAL MEDICINE

## 2018-09-11 RX ORDER — DIGOXIN 125 MCG
TABLET ORAL
Qty: 90 TAB | Refills: 3 | Status: CANCELLED | OUTPATIENT
Start: 2018-09-11

## 2018-09-11 RX ORDER — METFORMIN HYDROCHLORIDE 1000 MG/1
TABLET ORAL
Qty: 180 TAB | Refills: 3 | Status: CANCELLED | OUTPATIENT
Start: 2018-09-11

## 2018-09-11 RX ORDER — METOPROLOL TARTRATE 25 MG/1
TABLET, FILM COATED ORAL
Qty: 90 TAB | Refills: 3 | Status: SHIPPED | OUTPATIENT
Start: 2018-09-11 | End: 2019-10-02 | Stop reason: SDUPTHER

## 2018-09-11 NOTE — ASSESSMENT & PLAN NOTE
Stable, based on history, physical exam and review of pertinent labs, studies and medications; meds reconciled; continue current treatment plan. Key Antihyperglycemic Medications   
    
  
 metFORMIN (GLUCOPHAGE) 1,000 mg tablet  (Taking) take 1 tablet by mouth twice a day after meals Other Key Diabetic Medications Patient is on no other key diabetic meds. Lab Results Component Value Date/Time Hemoglobin A1c 5.9 08/14/2017 09:12 AM  
 Hemoglobin A1c (POC) 6.4 04/23/2018 10:29 AM  
 Glucose 117 04/23/2018 10:50 AM  
 Creatinine 1.35 04/23/2018 10:50 AM  
 Microalbumin/Creat ratio (mg/g creat) 37 08/14/2017 09:12 AM  
 Microalbumin,urine random 3.20 08/14/2017 09:12 AM  
 Cholesterol, total 161 04/23/2018 10:50 AM  
 HDL Cholesterol 39 04/23/2018 10:50 AM  
 LDL, calculated 110.8 04/23/2018 10:50 AM  
 Triglyceride 56 04/23/2018 10:50 AM  
 
Diabetic Foot and Eye Exam HM Status Topic Date Due [facility] Eye Exam  03/12/1973 [facility] Diabetic Foot Care  05/23/2018

## 2018-09-11 NOTE — MR AVS SNAPSHOT
Modesta Covarrubiasudder 
 
 
 [facility] [address] [facility]ly [facility] [id number] 
[phone] Patient: Britney Becerril MRN: [id number] :1965 Visit Information Date & Time Provider Department Dept. Phone Encounter #  
 2018  9:30 AM Robert Ferreira MD [address] [phone] [id number] Follow-up Instructions Return in about 6 months (around 3/11/2019) for f/u DM/HTN. Upcoming Health Maintenance Date Due  
 EYE EXAM RETINAL OR DILATED Q1 3/12/1973 FOBT Q 1 YEAR AGE 50-75 2017 FOOT EXAM Q1 2018 MICROALBUMIN Q1 2018 Influenza Age 5 to Adult 10/1/2018* HEMOGLOBIN A1C Q6M 10/23/2018 LIPID PANEL Q1 2019 DTaP/Tdap/Td series (2 - Td) 2026 *Topic was postponed. The date shown is not the original due date. Allergies as of 2018  Review Complete On: 2018 By: Robert Ferreira MD  
  
 Severity Noted Reaction Type Reactions Pcn [Penicillins]  06/15/2010    Unable to Obtain  
 child Current Immunizations  Reviewed on 9/10/2018 Name Date Tdap 2016  3:12 PM  
  
 Not reviewed this visit You Were Diagnosed With   
  
 Codes Comments Diabetes mellitus, stable ([facility]    -  Primary ICD-10-CM: E11.9 ICD-9-CM: 250.00 Benign hypertension without CHF     ICD-10-CM: I10 
ICD-9-CM: 401.1 Atrial fibrillation, unspecified type ([facility]     ICD-10-CM: I48.91 
ICD-9-CM: 427.31 Screening PSA (prostate specific antigen)     ICD-10-CM: Z12.5 ICD-9-CM: V76.44 Abnormal urine     ICD-10-CM: R82.90 ICD-9-CM: 791.9 Vitals BP Pulse Temp Resp Height(growth percentile) Weight(growth percentile) 132/80 (BP 1 Location: Left arm, BP Patient Position: Sitting) 69 97.3 °F (36.3 °C) (Oral) 17 5' 8\" (1.727 m) 218 lb 6.4 oz (99.1 kg) SpO2 BMI Smoking Status 100% 33.21 kg/m2 Never Smoker Vitals History BMI and BSA Data Body Mass Index Body Surface Area  
 33.21 kg/m 2 2.18 m 2 Preferred Pharmacy Pharmacy Name Phone [facility] [phone] [facility] [phone] Your Updated Medication List  
  
   
This list is accurate as of 9/11/18  9:35 AM.  Always use your most recent med list.  
  
  
  
  
 DIGITEK 0.125 mg tablet Generic drug:  digoxin  
take 1 tablet by mouth once daily  
  
 glucose blood VI test strips strip Commonly known as:  ASCENSIA AUTODISC VI, ONE TOUCH ULTRA TEST VI Check fasting sugars before breakfast and dinner. One Touch Ultra mini meter Lancets Misc Check fasting before breakfast and dinner. metFORMIN 1,000 mg tablet Commonly known as:  GLUCOPHAGE  
take 1 tablet by mouth twice a day after meals  
  
 metoprolol tartrate 25 mg tablet Commonly known as:  LOPRESSOR  
TAKE 1/2 TABLETS BY MOUTH TWICE A DAY  
  
 ONE TOUCH ULTRASMART SYSTEM  
by Does Not Apply route. Prescriptions Sent to Pharmacy Refills  
 metoprolol tartrate (LOPRESSOR) 25 mg tablet 3 Sig: TAKE 1/2 TABLETS BY MOUTH TWICE A DAY Class: Normal  
 Pharmacy: [address] [phone] [facility] Ph #: [phone] Follow-up Instructions Return in about 6 months (around 3/11/2019) for f/u DM/HTN. To-Do List   
 09/11/2018 Lab:  CBC W/O DIFF   
  
 09/11/2018 Microbiology:  CULTURE, URINE   
  
 09/11/2018 Lab:  DIGOXIN   
  
 09/11/2018 Lab:  HEMOGLOBIN A1C W/O EAG   
  
 09/11/2018 Lab:  LIPID PANEL   
  
 09/11/2018 Lab:  METABOLIC PANEL, COMPREHENSIVE   
  
 09/11/2018 Lab:  MICROALBUMIN, UR, RAND W/ MICROALB/CREAT RATIO   
  
 09/11/2018 Lab:  PSA, DIAGNOSTIC (PROSTATE SPECIFIC AG)   
  
 09/11/2018 Lab:  URINALYSIS W/ RFLX MICROSCOPIC Patient Instructions 1) Follow-up in 6 months or sooner if worsening symptoms. Introducing [facility]! Jackson Lal introduces Linkua patient portal. Now you can access parts of your medical record, email your doctor's office, and request medication refills online. 1. In your internet browser, go to [email or web address] 2. Click on the First Time User? Click Here link in the Sign In box. You will see the New Member Sign Up page. 3. Enter your Linkua Access Code exactly as it appears below. You will not need to use this code after youve completed the sign-up process. If you do not sign up before the expiration date, you must request a new code. · Linkua Access Code: [email or web address] Expires: 12/10/2018  9:35 AM 
 
4. Enter the last four digits of your Social Security Number (xxxx) and Date of Birth (mm/dd/yyyy) as indicated and click Submit. You will be taken to the next sign-up page. 5. Create a Linkua ID. This will be your Linkua login ID and cannot be changed, so think of one that is secure and easy to remember. 6. Create a Linkua password. You can change your password at any time. 7. Enter your Password Reset Question and Answer. This can be used at a later time if you forget your password. 8. Enter your e-mail address. You will receive e-mail notification when new information is available in [address] 9. Click Sign Up. You can now view and download portions of your medical record. 10. Click the Download Summary menu link to download a portable copy of your medical information. If you have questions, please visit the Frequently Asked Questions section of the Linkua website. Remember, Linkua is NOT to be used for urgent needs. For medical emergencies, dial 911. Now available from your iPhone and Android! Please provide this summary of care documentation to your next provider. Your primary care clinician is listed as Mike Falcon. If you have any questions after today's visit, please call [phone].

## 2018-09-11 NOTE — ASSESSMENT & PLAN NOTE
Stable, based on history, physical exam and review of pertinent labs, studies and medications; meds reconciled; continue current treatment plan. Key CAD CHF Meds   
    
  
 metoprolol tartrate (LOPRESSOR) 25 mg tablet  (Taking) TAKE 1/2 TABLETS BY MOUTH TWICE A DAY  
 DIGITEK 125 mcg tablet  (Taking) take 1 tablet by mouth once daily [id number](BNP,BNPP,BNPPPOC,HSCRP,NA,NAPOC,K,KPOCT,CHOL,CHOLPOCT,HDL,HDLPOC,LDLCHOL,LDLPOCT,LDLCPOC,LDLC,LDL,LDLCEXT,TRIGL,TGLPOCT,INR,INREXT,INRPOC,PTP,PTINR,PTEXT,DIG)@

## 2018-09-11 NOTE — PROGRESS NOTES
Chief Complaint Patient presents with  Diabetes f/u  Medication Refill HPI:  
 
Aubrey Mukherjee is a 52 y.o.  male with history of type 2 DM and atrial fibrillation here for the above complaint. He denies any chest pain, shortness of breath, abdominal pain, headaches or dizziness. He has had increase appetite and he is working on this. He said the metformin 1000mg one po bid was controlling his sugars better than the 500mg one po bid. Type 2 DM: sugar range: 110-140. He had one time when he thought he would have palpitations, but that resolved. Past Medical History:  
Diagnosis Date  A-fib ([facility]  Diabetes mellitus ([address]  H/O ETOH abuse  Peripheral neuropathy   
 from DM  Vitamin D deficiency 7/2016 Past Surgical History:  
Procedure Laterality Date  HX NEPHRECTOMY Left 05/2016 Dr. Deanna Mcguire Current Outpatient Prescriptions Medication Sig  
 metFORMIN (GLUCOPHAGE) 1,000 mg tablet take 1 tablet by mouth twice a day after meals  metoprolol tartrate (LOPRESSOR) 25 mg tablet TAKE 1/2 TABLETS BY MOUTH TWICE A DAY  DIGITEK 125 mcg tablet take 1 tablet by mouth once daily  BLOOD-GLUCOSE METER (ONE TOUCH ULTRASMART SYSTEM) by Does Not Apply route.  glucose blood VI test strips (ASCENSIA AUTODISC VI, ONE TOUCH ULTRA TEST VI) strip Check fasting sugars before breakfast and dinner. One Touch Ultra mini meter  Lancets misc Check fasting before breakfast and dinner. No current facility-administered medications for this visit. Health Maintenance Topic Date Due  
 EYE EXAM RETINAL OR DILATED Q1  03/12/1973  
 FOBT Q 1 YEAR AGE 50-75  11/07/2017  
 FOOT EXAM Q1  05/23/2018  MICROALBUMIN Q1  08/14/2018  Influenza Age 5 to Adult  10/01/2018 (Originally 8/1/2018)  HEMOGLOBIN A1C Q6M  10/23/2018  LIPID PANEL Q1  04/23/2019  
 DTaP/Tdap/Td series (2 - Td) 07/21/2026  Hepatitis C Screening  Addressed  Pneumococcal 19-64 Medium Risk  Addressed Immunization History Administered Date(s) Administered  Tdap 07/21/2016 No LMP for male patient. Allergies and Intolerances: Allergies Allergen Reactions  Pcn [Penicillins] Unable to Obtain  
  child Family History:  
Family History Problem Relation Age of Onset  Cancer Mother   
  lung cancer  Cancer Father   
  brain and liver ca Social History: He  reports that he has never smoked. He has never used smokeless tobacco.  He  reports that he drinks alcohol. ·  
 
Objective:  
Physical exam:  
Visit Vitals  /80 (BP 1 Location: Left arm, BP Patient Position: Sitting)  Pulse 69  Temp 97.3 °F (36.3 °C) (Oral)  Resp 17  Ht 5' 8\" (1.727 m)  Wt 218 lb 6.4 oz (99.1 kg)  SpO2 100%  BMI 33.21 kg/m2 Generally: Pleasant male in no acute distress Cardiac Exam: regular, rate, and rhythm. Normal S1 and S2. No murmurs, gallops, or rubs Pulmonary exam: Clear to auscultation bilaterally Abdominal exam: Positive bowel sounds in all four quadrants, soft, nondistended, nontender Extremities: 2+ dorsalis pedis pulses bilaterally. No pedal edema  
 bilaterally LABS/Radiological Tests: 
Lab Results Component Value Date/Time WBC 7.0 08/14/2017 09:12 AM  
 HGB 13.1 08/14/2017 09:12 AM  
 HCT 40.4 08/14/2017 09:12 AM  
 PLATELET 994 61/18/5930 09:12 AM  
 
Lab Results Component Value Date/Time Sodium 140 04/23/2018 10:50 AM  
 Potassium 5.0 04/23/2018 10:50 AM  
 Chloride 107 04/23/2018 10:50 AM  
 CO2 26 04/23/2018 10:50 AM  
 Glucose 117 (H) 04/23/2018 10:50 AM  
 BUN 17 04/23/2018 10:50 AM  
 Creatinine 1.35 (H) 04/23/2018 10:50 AM  
 
Lab Results Component Value Date/Time  Cholesterol, total 161 04/23/2018 10:50 AM  
 HDL Cholesterol 39 (L) 04/23/2018 10:50 AM  
 LDL, calculated 110.8 (H) 04/23/2018 10:50 AM  
 Triglyceride 56 04/23/2018 10:50 AM  
 
No results found for: GPT 
 Component Latest Ref Rng & Units 4/23/2018 10:29 AM  
Hemoglobin A1c (POC) 
    % 6.4 Previous labs ASSESSMENT/PLAN:   
1. Type 2 diabetes mellitus without complication, without long-term current use of insulin ([facility].): seems stable. We will see what the labs show. Continue diet, exercise and metformin. Assessment & Plan: 
Stable, based on history, physical exam and review of pertinent labs, studies and medications; meds reconciled; continue current treatment plan. Key Antihyperglycemic Medications   
    
  
 metFORMIN (GLUCOPHAGE) 1,000 mg tablet  (Taking) take 1 tablet by mouth twice a day after meals Other Key Diabetic Medications Patient is on no other key diabetic meds. Lab Results Component Value Date/Time Hemoglobin A1c 5.9 08/14/2017 09:12 AM  
 Hemoglobin A1c (POC) 6.4 04/23/2018 10:29 AM  
 Glucose 117 04/23/2018 10:50 AM  
 Creatinine 1.35 04/23/2018 10:50 AM  
 Microalbumin/Creat ratio (mg/g creat) 37 08/14/2017 09:12 AM  
 Microalbumin,urine random 3.20 08/14/2017 09:12 AM  
 Cholesterol, total 161 04/23/2018 10:50 AM  
 HDL Cholesterol 39 04/23/2018 10:50 AM  
 LDL, calculated 110.8 04/23/2018 10:50 AM  
 Triglyceride 56 04/23/2018 10:50 AM  
 
Diabetic Foot and Eye Exam HM Status Topic Date Due [facility] Exam  03/12/1975 [facility] Diabetic Foot Care  05/23/2018 Orders: -     MICROALBUMIN, UR, RAND W/ MICROALB/CREAT RATIO; Future 
-     HEMOGLOBIN A1C W/O EAG; Future -     LIPID PANEL; Future -     METABOLIC PANEL, COMPREHENSIVE; Future -     URINALYSIS W/ RFLX MICROSCOPIC; Future 2. Atrial fibrillation, unspecified type ([facility].): stable. Continue the digoxin and metoprolol.  
-     metoprolol tartrate (LOPRESSOR) 25 mg tablet; TAKE 1/2 TABLETS BY MOUTH TWICE A DAY Assessment & Plan: 
Stable, based on history, physical exam and review of pertinent labs, studies and medications; meds reconciled; continue current treatment plan. Key CAD CHF Meds metoprolol tartrate (LOPRESSOR) 25 mg tablet  (Taking) TAKE 1/2 TABLETS BY MOUTH TWICE A DAY  
 DIGITEK 125 mcg tablet  (Taking) take 1 tablet by mouth once daily Orders: 
-     DIGOXIN; Future -     CBC W/O DIFF; Future 3. Screening PSA (prostate specific antigen): he refused prostate exam.  
 
-     PROSTATE SPECIFIC AG; Future 4. Abnormal urine 
-     CULTURE, URINE; Future 5. Requested Prescriptions Signed Prescriptions Disp Refills  metoprolol tartrate (LOPRESSOR) 25 mg tablet 90 Tab 3 Sig: TAKE 1/2 TABLETS BY MOUTH TWICE A DAY 6. Patient verbalized understanding and agreement with the plan. 7. Patient was given an after-visit summary. 8. Follow-up Disposition: 
Return in about 6 months (around 3/11/2019) for f/u DM/HTN. or sooner if worsening symptoms.  
 
 
 
 
 
 
 
Marlene Kidd MD

## 2018-09-11 NOTE — PROGRESS NOTES
ROOM # 1 Corey Cook presents today for Chief Complaint Patient presents with  Diabetes f/u  Medication Refill Requested Prescriptions Pending Prescriptions Disp Refills  metFORMIN (GLUCOPHAGE) 1,000 mg tablet 180 Tab 3  
 digoxin (DIGITEK) 0.125 mg tablet 90 Tab 3  
 metoprolol tartrate (LOPRESSOR) 25 mg tablet 90 Tab 3 Sig: TAKE 1/2 TABLETS BY MOUTH TWICE A DAY Corey Cook preferred language for health care discussion is english/other. Is someone accompanying this pt? no 
 
Is the patient using any DME equipment during OV? no 
 
Depression Screening: PHQ over the last two weeks 9/11/2018 9/11/2018 4/23/2018 8/14/2017 2/7/2017 6/30/2016 Little interest or pleasure in doing things Not at all Not at all Not at all Not at all Not at all Not at all Feeling down, depressed, irritable, or hopeless Not at all Not at all Not at all Not at all Not at all Not at all Total Score PHQ 2 0 0 0 0 0 0 Learning Assessment: 
Learning Assessment 6/30/2016 PRIMARY LEARNER Patient PRIMARY LANGUAGE ENGLISH  
LEARNER PREFERENCE PRIMARY DEMONSTRATION  
  READING  
ANSWERED BY patient RELATIONSHIP SELF Abuse Screening: 
Abuse Screening Questionnaire 9/11/2018 Do you ever feel afraid of your partner? Meagan Yvon Are you in a relationship with someone who physically or mentally threatens you? Meagan Sanz Is it safe for you to go home? Candy Jenkins Fall Risk No flowsheet data found. Health Maintenance reviewed and discussed per provider. Yes Corey Cook is due for Health Maintenance Due Topic Date Due  
 EYE EXAM RETINAL OR DILATED Q1  03/12/1973  
 FOBT Q 1 YEAR AGE 50-75  11/07/2017  
 FOOT EXAM Q1  05/23/2018  MICROALBUMIN Q1  08/14/2018 Pt. To schedule c Dr. Kashmir Benz for foot exam. 
Pt. To schedule eye exam 
 
 Please order/place referral if appropriate. Advance Directive: 1. Do you have an advance directive in place?  Patient Reply: no 
 
 2. If not, would you like material regarding how to put one in place? Patient Reply: no 
 
Coordination of Care: 1. Have you been to the ER, urgent care clinic since your last visit? Hospitalized since your last visit? no 
 
2. Have you seen or consulted any other health care providers outside of the [address] [place] [address] since your last visit? Include any pap smears or colon screening.  no

## 2018-09-12 LAB
CREAT UR-MCNC: 96.75 MG/DL (ref 30–125)
MICROALBUMIN UR-MCNC: 14.4 MG/DL (ref 0–3)
MICROALBUMIN/CREAT UR-RTO: 149 MG/G (ref 0–30)

## 2018-09-13 ENCOUNTER — TELEPHONE (OUTPATIENT)
Dept: INTERNAL MEDICINE CLINIC | Age: 53
End: 2018-09-13

## 2018-09-13 DIAGNOSIS — R74.8 ELEVATED ALKALINE PHOSPHATASE LEVEL: Primary | ICD-10-CM

## 2018-09-13 LAB
BACTERIA SPEC CULT: NORMAL
SERVICE CMNT-IMP: NORMAL

## 2018-09-13 NOTE — TELEPHONE ENCOUNTER
Mike Vallejo MD  P Pcg Nurse Pool                     Please let pt know that labs were normal except:     1) protein in urine. We will monitor. Is he willing to start lisinopril 2.5mg one po daily for renal protection due to DM and this will help with protein in urine? Side effects are cough, headaches, dizziness and very rarely swelling of the mouth and tongue and if that happens, he needs to go to the ER. 2) HgA1C is 6. 7.glucose up at 155.  Continue to work on diet and exercise and the metformin 1000mg one po bid. 3) HDL low at 32 and LDL up at 119.6 and needs to be 100 or less. Work on diet and exercise. 4) creatinine little up at 1.31 from most likely due to dehydration from fasting.     5) alk phos up at 136. Any RUQ pain, yellowing of eyes or skin, n/v?              2 Pt Identifiers verified. Notified pt of above result message. Verbalized understanding.  Pt states would like to hold off on trying lisiniprol at this time

## 2018-09-13 NOTE — TELEPHONE ENCOUNTER
----- Message from Tin Mathis MD sent at 9/13/2018 10:59 AM EDT -----  Please let pt know that labs were normal except:    1) protein in urine. We will monitor. Is he willing to start lisinopril 2.5mg one po daily for renal protection due to DM and this will help with protein in urine? Side effects are cough, headaches, dizziness and very rarely swelling of the mouth and tongue and if that happens, he needs to go to the ER. 2) HgA1C is 6. 7.glucose up at 155. Continue to work on diet and exercise and the metformin 1000mg one po bid. 3) HDL low at 32 and LDL up at 119.6 and needs to be 100 or less. Work on diet and exercise. 4) creatinine little up at 1.31 from most likely due to dehydration from fasting.     5) alk phos up at 136.  Any RUQ pain, yellowing of eyes or skin, n/v?

## 2018-09-13 NOTE — TELEPHONE ENCOUNTER
1) It is recommended he try the lisinopril, but it is his choice. 2) Just get better control on DM. 3) We will need to recheck alk phos in 2 weeks. Order in connect care.

## 2018-09-13 NOTE — TELEPHONE ENCOUNTER
----- Message from Chris Galeano MD sent at 9/13/2018 10:59 AM EDT -----  Please let pt know that labs were normal except:    1) protein in urine. We will monitor. Is he willing to start lisinopril 2.5mg one po daily for renal protection due to DM and this will help with protein in urine? Side effects are cough, headaches, dizziness and very rarely swelling of the mouth and tongue and if that happens, he needs to go to the ER. 2) HgA1C is 6. 7.glucose up at 155. Continue to work on diet and exercise and the metformin 1000mg one po bid. 3) HDL low at 32 and LDL up at 119.6 and needs to be 100 or less. Work on diet and exercise. 4) creatinine little up at 1.31 from most likely due to dehydration from fasting.     5) alk phos up at 136.  Any RUQ pain, yellowing of eyes or skin, n/v?

## 2018-09-13 NOTE — PROGRESS NOTES
Please let pt know that labs were normal except:    1) protein in urine. We will monitor. Is he willing to start lisinopril 2.5mg one po daily for renal protection due to DM and this will help with protein in urine? Side effects are cough, headaches, dizziness and very rarely swelling of the mouth and tongue and if that happens, he needs to go to the ER. 2) HgA1C is 6. 7.glucose up at 155. Continue to work on diet and exercise and the metformin 1000mg one po bid. 3) HDL low at 32 and LDL up at 119.6 and needs to be 100 or less. Work on diet and exercise. 4) creatinine little up at 1.31 from most likely due to dehydration from fasting.     5) alk phos up at 136.  Any RUQ pain, yellowing of eyes or skin, n/v?

## 2018-09-13 NOTE — LETTER
9/18/2018 12:17 PM 
 
Mr. Adan Best [address] [phone] Dear Carmencita Lucero: 
 
I hope this letter finds you well. I am a Licensed Practical Nurse with [facility] and I have attempted to contact you by phone, but was unsuccessful. Your good health is important to us. As always, our goal is to be your partner in life-long wellness. Please contact our office at your earliest convenience. If you have any questions, please do not hesitate to give us a call at the number listed above. Sincerely, Jassi Laurent LPN

## 2018-09-17 NOTE — TELEPHONE ENCOUNTER
Attempted to contact pt at  number, no answer. m for pt to return call to office at [phone]. Will continue to try to contact pt. Mailed arrival letter

## 2018-09-18 NOTE — TELEPHONE ENCOUNTER
Attempted to contact pt at  number, no answer. Lvm for pt to return call to office at [phone]. I have been unable to reach this patient by phone. A letter is being sent to the last known home address. Encounter will be closed.

## 2018-09-19 ENCOUNTER — TELEPHONE (OUTPATIENT)
Dept: INTERNAL MEDICINE CLINIC | Age: 53
End: 2018-09-19

## 2018-09-20 NOTE — TELEPHONE ENCOUNTER
Incoming call from pt 2 pt identifiers confirmed informed pt of Dr Gertrudis Wheeler recommendation regarding the lisinopril pt stated understanding and that he will be back in the office in 2 weeks to do his labs. Stated understanding no other questions or concerns noted at this time.

## 2019-09-26 RX ORDER — ROSUVASTATIN CALCIUM 10 MG/1
TABLET, FILM COATED ORAL
Qty: 90 TAB | Refills: 3 | OUTPATIENT
Start: 2019-09-26

## 2019-09-26 NOTE — TELEPHONE ENCOUNTER
Last seen on 9/11/18. Denied:    Requested Prescriptions     Refused Prescriptions Disp Refills    DIGITEK 125 mcg tablet [Pharmacy Med Name: Lea Rolle 125 MCG TABLET] 90 Tab 3     Sig: take 1 tablet by mouth once daily     Refused By: Emelyn Gonzalez     Reason for Refusal: Appt required, please call patient     Needs OV.

## 2019-09-26 NOTE — TELEPHONE ENCOUNTER
Patient contacted at home number. 2 patient identifiers confirmed. Patient informed of below. Patient verbalized understanding. Patient scheduled for Wednesday, October 02, 2019 11:00 AM. No other questions at this time.

## 2019-10-02 ENCOUNTER — OFFICE VISIT (OUTPATIENT)
Dept: INTERNAL MEDICINE CLINIC | Age: 54
End: 2019-10-02

## 2019-10-02 ENCOUNTER — HOSPITAL ENCOUNTER (OUTPATIENT)
Dept: LAB | Age: 54
Discharge: HOME | End: 2019-10-02
Payer: COMMERCIAL

## 2019-10-02 VITALS
BODY MASS INDEX: 30.01 KG/M2 | HEIGHT: 68 IN | OXYGEN SATURATION: 99 % | SYSTOLIC BLOOD PRESSURE: 133 MMHG | TEMPERATURE: 98.3 F | RESPIRATION RATE: 17 BRPM | HEART RATE: 66 BPM | WEIGHT: 198 LBS | DIASTOLIC BLOOD PRESSURE: 75 MMHG

## 2019-10-02 DIAGNOSIS — E55.9 VITAMIN D DEFICIENCY: ICD-10-CM

## 2019-10-02 DIAGNOSIS — Z12.5 SCREENING PSA (PROSTATE SPECIFIC ANTIGEN): ICD-10-CM

## 2019-10-02 DIAGNOSIS — I48.91 ATRIAL FIBRILLATION, UNSPECIFIED TYPE (HCC): ICD-10-CM

## 2019-10-02 DIAGNOSIS — E11.9 DIABETES MELLITUS, STABLE (HCC): Primary | ICD-10-CM

## 2019-10-02 DIAGNOSIS — E11.9 DIABETES MELLITUS, STABLE (HCC): ICD-10-CM

## 2019-10-02 PROBLEM — E11.40 TYPE 2 DIABETES MELLITUS WITH DIABETIC NEUROPATHY (HCC): Status: ACTIVE | Noted: 2019-10-02

## 2019-10-02 LAB
25(OH)D3 SERPL-MCNC: 19.3 NG/ML (ref 30–100)
ALBUMIN SERPL-MCNC: 3.9 G/DL (ref 3.4–5)
ALBUMIN/GLOB SERPL: 1.1 {RATIO} (ref 0.8–1.7)
ALP SERPL-CCNC: 95 U/L (ref 45–117)
ALT SERPL-CCNC: 16 U/L (ref 16–61)
ANION GAP SERPL CALC-SCNC: 8 MMOL/L (ref 3–18)
APPEARANCE UR: CLEAR
AST SERPL-CCNC: 10 U/L (ref 10–38)
BILIRUB SERPL-MCNC: 0.6 MG/DL (ref 0.2–1)
BILIRUB UR QL: NEGATIVE
BUN SERPL-MCNC: 16 MG/DL (ref 7–18)
BUN/CREAT SERPL: 13 (ref 12–20)
CALCIUM SERPL-MCNC: 8.6 MG/DL (ref 8.5–10.1)
CHLORIDE SERPL-SCNC: 104 MMOL/L (ref 100–111)
CHOLEST SERPL-MCNC: 187 MG/DL
CO2 SERPL-SCNC: 27 MMOL/L (ref 21–32)
COLOR UR: YELLOW
CREAT SERPL-MCNC: 1.19 MG/DL (ref 0.6–1.3)
CREAT UR-MCNC: 14 MG/DL (ref 30–125)
DIGOXIN SERPL-MCNC: 0.5 NG/ML (ref 0.9–2)
ERYTHROCYTE [DISTWIDTH] IN BLOOD BY AUTOMATED COUNT: 13.8 % (ref 11.6–14.5)
EST. AVERAGE GLUCOSE BLD GHB EST-MCNC: 123 MG/DL
GLOBULIN SER CALC-MCNC: 3.4 G/DL (ref 2–4)
GLUCOSE SERPL-MCNC: 99 MG/DL (ref 74–99)
GLUCOSE UR STRIP.AUTO-MCNC: NEGATIVE MG/DL
HBA1C MFR BLD: 5.9 % (ref 4.2–5.6)
HCT VFR BLD AUTO: 40.5 % (ref 36–48)
HDLC SERPL-MCNC: 43 MG/DL (ref 40–60)
HDLC SERPL: 4.3 {RATIO} (ref 0–5)
HGB BLD-MCNC: 13 G/DL (ref 13–16)
HGB UR QL STRIP: NEGATIVE
KETONES UR QL STRIP.AUTO: NEGATIVE MG/DL
LDLC SERPL CALC-MCNC: 131 MG/DL (ref 0–100)
LEUKOCYTE ESTERASE UR QL STRIP.AUTO: NEGATIVE
LIPID PROFILE,FLP: ABNORMAL
MCH RBC QN AUTO: 28.8 PG (ref 24–34)
MCHC RBC AUTO-ENTMCNC: 32.1 G/DL (ref 31–37)
MCV RBC AUTO: 89.8 FL (ref 74–97)
MICROALBUMIN UR-MCNC: 2.86 MG/DL (ref 0–3)
MICROALBUMIN/CREAT UR-RTO: 204 MG/G (ref 0–30)
NITRITE UR QL STRIP.AUTO: NEGATIVE
PH UR STRIP: 6 [PH] (ref 5–8)
PLATELET # BLD AUTO: 229 K/UL (ref 135–420)
PMV BLD AUTO: 10.4 FL (ref 9.2–11.8)
POTASSIUM SERPL-SCNC: 4.2 MMOL/L (ref 3.5–5.5)
PROT SERPL-MCNC: 7.3 G/DL (ref 6.4–8.2)
PROT UR STRIP-MCNC: NEGATIVE MG/DL
PSA SERPL-MCNC: 0.5 NG/ML (ref 0–4)
RBC # BLD AUTO: 4.51 M/UL (ref 4.7–5.5)
SODIUM SERPL-SCNC: 139 MMOL/L (ref 136–145)
SP GR UR REFRACTOMETRY: 1.01 (ref 1–1.03)
TRIGL SERPL-MCNC: 65 MG/DL (ref ?–150)
TSH SERPL DL<=0.05 MIU/L-ACNC: 1.94 UIU/ML (ref 0.36–3.74)
UROBILINOGEN UR QL STRIP.AUTO: 0.2 EU/DL (ref 0.2–1)
VLDLC SERPL CALC-MCNC: 13 MG/DL
WBC # BLD AUTO: 7.5 K/UL (ref 4.6–13.2)

## 2019-10-02 PROCEDURE — 82043 UR ALBUMIN QUANTITATIVE: CPT

## 2019-10-02 PROCEDURE — 80061 LIPID PANEL: CPT

## 2019-10-02 PROCEDURE — 80162 ASSAY OF DIGOXIN TOTAL: CPT

## 2019-10-02 PROCEDURE — 85027 COMPLETE CBC AUTOMATED: CPT

## 2019-10-02 PROCEDURE — 83036 HEMOGLOBIN GLYCOSYLATED A1C: CPT

## 2019-10-02 PROCEDURE — 84443 ASSAY THYROID STIM HORMONE: CPT

## 2019-10-02 PROCEDURE — 80053 COMPREHEN METABOLIC PANEL: CPT

## 2019-10-02 PROCEDURE — 81003 URINALYSIS AUTO W/O SCOPE: CPT

## 2019-10-02 PROCEDURE — 82306 VITAMIN D 25 HYDROXY: CPT

## 2019-10-02 PROCEDURE — 84153 ASSAY OF PSA TOTAL: CPT

## 2019-10-02 PROCEDURE — 36415 COLL VENOUS BLD VENIPUNCTURE: CPT

## 2019-10-02 RX ORDER — METOPROLOL TARTRATE 25 MG/1
TABLET, FILM COATED ORAL
Qty: 90 TAB | Refills: 3 | Status: SHIPPED | OUTPATIENT
Start: 2019-10-02 | End: 2020-06-22 | Stop reason: SDUPTHER

## 2019-10-02 RX ORDER — DIGOXIN 125 MCG
TABLET ORAL
Qty: 90 TAB | Refills: 3 | Status: SHIPPED | OUTPATIENT
Start: 2019-10-02 | End: 2021-03-01 | Stop reason: ALTCHOICE

## 2019-10-02 RX ORDER — METFORMIN HYDROCHLORIDE 1000 MG/1
TABLET ORAL
Qty: 180 TAB | Refills: 3 | Status: SHIPPED | OUTPATIENT
Start: 2019-10-02 | End: 2020-06-22 | Stop reason: SDUPTHER

## 2019-10-02 NOTE — PROGRESS NOTES
Chief Complaint   Patient presents with    Diabetes    Hypertension     f/u       HPI:     Carolyn Pérez is a 62 y.o.  male with history of type 2 DM, atrial fibrillation and vitamin D deficiency  here for the above complaint. He denies any chest pain, shortness of breath, abdominal pain, headaches or dizziness. Type 2 DM: sugar range:  123-135            Past Medical History:   Diagnosis Date    A-fib ([address]     Diabetes mellitus ([address]     H/O ETOH abuse     Peripheral neuropathy     from DM    Vitamin D deficiency 7/2016     Past Surgical History:   Procedure Laterality Date    HX NEPHRECTOMY Left 05/2016    Dr. Lidya Martines     Current Outpatient Medications   Medication Sig    metFORMIN (GLUCOPHAGE) 1,000 mg tablet take 1 tablet by mouth twice a day after meals    metoprolol tartrate (LOPRESSOR) 25 mg tablet TAKE 1/2 TABLETS BY MOUTH TWICE A DAY    digoxin (DIGITEK) 0.125 mg tablet take 1 tablet by mouth once daily    BLOOD-GLUCOSE METER (OneShield Veg 149) by Does Not Apply route.  glucose blood VI test strips (ASCENSIA AUTODISC VI, ONE TOUCH ULTRA TEST VI) strip Check fasting sugars before breakfast and dinner. One Touch Ultra mini meter    Lancets misc Check fasting before breakfast and dinner. No current facility-administered medications for this visit. Health Maintenance   Topic Date Due    Pneumococcal 0-64 years (1 of 1 - PPSV23) 03/12/1969    EYE EXAM RETINAL OR DILATED  03/12/1973    Shingrix Vaccine Age 50> (1 of 2) 03/12/2013    FOBT Q 1 YEAR AGE 50-75  11/07/2017    FOOT EXAM Q1  05/23/2018    HEMOGLOBIN A1C Q6M  03/11/2019    Influenza Age 9 to Adult  08/01/2019    MICROALBUMIN Q1  09/11/2019    LIPID PANEL Q1  09/11/2019    DTaP/Tdap/Td series (2 - Td) 07/21/2026    Hepatitis C Screening  Addressed     Immunization History   Administered Date(s) Administered    Tdap 07/21/2016     No LMP for male patient.         Allergies and Intolerances: Allergies   Allergen Reactions    Pcn [Penicillins] Unable to Obtain     child       Family History:   Family History   Problem Relation Age of Onset    Cancer Mother         lung cancer    Cancer Father         brain and liver ca       Social History:   He  reports that he has never smoked. He has never used smokeless tobacco.  He  reports that he drinks alcohol. Objective:   Physical exam:   Visit Vitals  /75 (BP 1 Location: Right arm, BP Patient Position: Sitting)   Pulse 66   Temp 98.3 °F (36.8 °C) (Oral)   Resp 17   Ht 5' 8\" (1.727 m)   Wt 198 lb (89.8 kg)   SpO2 99%   BMI 30.11 kg/m²        Generally: Pleasant male in no acute distress  Cardiac Exam: regular, rate, and rhythm. Normal S1 and S2. No murmurs, gallops, or rubs  Pulmonary exam: Clear to auscultation bilaterally  Abdominal exam: Positive bowel sounds in all four quadrants, soft, nondistended, nontender  Extremities: 2+ dorsalis pedis pulses bilaterally. No pedal edema    bilaterally    LABS/Radiological Tests:  Component      Latest Ref Rng & Units 9/11/2018 9/11/2018 9/11/2018           9:53 AM  9:53 AM  9:53 AM   Sodium      136 - 145 mmol/L      Potassium      3.5 - 5.5 mmol/L      Chloride      100 - 108 mmol/L      CO2      21 - 32 mmol/L      Anion gap      3.0 - 18 mmol/L      Glucose      74 - 99 mg/dL      BUN      7.0 - 18 MG/DL      Creatinine      0.6 - 1.3 MG/DL      BUN/Creatinine ratio      12 - 20        GFR est AA      >60 ml/min/1.73m2      GFR est non-AA      >60 ml/min/1.73m2      Calcium      8.5 - 10.1 MG/DL      Bilirubin, total      0.2 - 1.0 MG/DL      ALT (SGPT)      16 - 61 U/L      AST      15 - 37 U/L      Alk.  phosphatase      45 - 117 U/L      Protein, total      6.4 - 8.2 g/dL      Albumin      3.4 - 5.0 g/dL      Globulin      2.0 - 4.0 g/dL      A-G Ratio      0.8 - 1.7        Color            Appearance            Specific gravity      1.005 - 1.030        pH (UA)      5.0 - 8.0        Protein NEG mg/dL      Glucose      NEG mg/dL      Ketone      NEG mg/dL      Bilirubin      NEG        Blood      NEG        Urobilinogen      0.2 - 1.0 EU/dL      Nitrites      NEG        Leukocyte Esterase      NEG        WBC      4.6 - 13.2 K/uL      RBC      4.70 - 5.50 M/uL      HGB      13.0 - 16.0 g/dL      HCT      36.0 - 48.0 %      MCV      74.0 - 97.0 FL      MCH      24.0 - 34.0 PG      MCHC      31.0 - 37.0 g/dL      RDW      11.6 - 14.5 %      PLATELET      153 - 978 K/uL      MPV      9.2 - 11.8 FL      Cholesterol, total      <200 MG/DL      Triglyceride      <150 MG/DL      HDL Cholesterol      40 - 60 MG/DL      LDL, calculated      0 - 100 MG/DL      VLDL, calculated      MG/DL      CHOL/HDL Ratio      0 - 5.0        WBC      0 - 4 /hpf   0 to 1   RBC      0 - 5 /hpf   0   Epithelial cells      0 - 5 /lpf   FEW   Bacteria      NEG /hpf   NEGATIVE   Microalbumin,urine random      0 - 3.0 MG/DL  14.40 (H)    Creatinine, urine      30 - 125 mg/dL  96.75    Microalbumin/Creat.  Ratio      0 - 30 mg/g  149 (H)    Special Requests:       NO SPECIAL REQUESTS     Culture result:       NO GROWTH 2 DAYS     Hemoglobin A1c (POC)      %      TSH      0.36 - 3.74 uIU/mL      Hemoglobin A1c, (calculated)      4.2 - 5.6 %      Prostate Specific Ag      0.0 - 4.0 ng/mL      Digoxin level      0.9 - 2.0 NG/ML        Component      Latest Ref Rng & Units 9/11/2018 9/11/2018 9/11/2018 9/11/2018           9:53 AM  9:53 AM  9:53 AM  9:53 AM   Sodium      136 - 145 mmol/L       Potassium      3.5 - 5.5 mmol/L       Chloride      100 - 108 mmol/L       CO2      21 - 32 mmol/L       Anion gap      3.0 - 18 mmol/L       Glucose      74 - 99 mg/dL       BUN      7.0 - 18 MG/DL       Creatinine      0.6 - 1.3 MG/DL       BUN/Creatinine ratio      12 - 20         GFR est AA      >60 ml/min/1.73m2       GFR est non-AA      >60 ml/min/1.73m2       Calcium      8.5 - 10.1 MG/DL       Bilirubin, total      0.2 - 1.0 MG/DL       ALT (SGPT)      16 - 61 U/L       AST      15 - 37 U/L       Alk. phosphatase      45 - 117 U/L       Protein, total      6.4 - 8.2 g/dL       Albumin      3.4 - 5.0 g/dL       Globulin      2.0 - 4.0 g/dL       A-G Ratio      0.8 - 1.7         Color       YELLOW      Appearance       CLEAR      Specific gravity      1.005 - 1.030   1.017      pH (UA)      5.0 - 8.0   6.5      Protein      NEG mg/dL 30 (A)      Glucose      NEG mg/dL NEGATIVE      Ketone      NEG mg/dL NEGATIVE      Bilirubin      NEG   NEGATIVE      Blood      NEG   NEGATIVE      Urobilinogen      0.2 - 1.0 EU/dL 0.2      Nitrites      NEG   NEGATIVE      Leukocyte Esterase      NEG   NEGATIVE      WBC      4.6 - 13.2 K/uL       RBC      4.70 - 5.50 M/uL       HGB      13.0 - 16.0 g/dL       HCT      36.0 - 48.0 %       MCV      74.0 - 97.0 FL       MCH      24.0 - 34.0 PG       MCHC      31.0 - 37.0 g/dL       RDW      11.6 - 14.5 %       PLATELET      118 - 415 K/uL       MPV      9.2 - 11.8 FL       Cholesterol, total      <200 MG/DL       Triglyceride      <150 MG/DL       HDL Cholesterol      40 - 60 MG/DL       LDL, calculated      0 - 100 MG/DL       VLDL, calculated      MG/DL       CHOL/HDL Ratio      0 - 5.0         WBC      0 - 4 /hpf       RBC      0 - 5 /hpf       Epithelial cells      0 - 5 /lpf       Bacteria      NEG /hpf       Microalbumin,urine random      0 - 3.0 MG/DL       Creatinine, urine      30 - 125 mg/dL       Microalbumin/Creat.  Ratio      0 - 30 mg/g       Special Requests:             Culture result:             Hemoglobin A1c (POC)      %       TSH      0.36 - 3.74 uIU/mL       Hemoglobin A1c, (calculated)      4.2 - 5.6 %  6.7 (H)     Prostate Specific Ag      0.0 - 4.0 ng/mL    0.4   Digoxin level      0.9 - 2.0 NG/ML   0.9      Component      Latest Ref Rng & Units 9/11/2018 9/11/2018 9/11/2018 4/23/2018           9:53 AM  9:53 AM  9:53 AM 10:50 AM   Sodium      136 - 145 mmol/L 137      Potassium      3.5 - 5.5 mmol/L 4.5      Chloride      100 - 108 mmol/L 103      CO2      21 - 32 mmol/L 25      Anion gap      3.0 - 18 mmol/L 9      Glucose      74 - 99 mg/dL 155 (H)      BUN      7.0 - 18 MG/DL 15      Creatinine      0.6 - 1.3 MG/DL 1.31 (H)      BUN/Creatinine ratio      12 - 20   11 (L)      GFR est AA      >60 ml/min/1.73m2 >60      GFR est non-AA      >60 ml/min/1.73m2 57 (L)      Calcium      8.5 - 10.1 MG/DL 8.9      Bilirubin, total      0.2 - 1.0 MG/DL 0.6      ALT (SGPT)      16 - 61 U/L 19      AST      15 - 37 U/L 13 (L)      Alk. phosphatase      45 - 117 U/L 136 (H)      Protein, total      6.4 - 8.2 g/dL 7.3      Albumin      3.4 - 5.0 g/dL 3.8      Globulin      2.0 - 4.0 g/dL 3.5      A-G Ratio      0.8 - 1.7   1.1      Color             Appearance             Specific gravity      1.005 - 1.030         pH (UA)      5.0 - 8.0         Protein      NEG mg/dL       Glucose      NEG mg/dL       Ketone      NEG mg/dL       Bilirubin      NEG         Blood      NEG         Urobilinogen      0.2 - 1.0 EU/dL       Nitrites      NEG         Leukocyte Esterase      NEG         WBC      4.6 - 13.2 K/uL   7.8    RBC      4.70 - 5.50 M/uL   4.49 (L)    HGB      13.0 - 16.0 g/dL   13.0    HCT      36.0 - 48.0 %   39.5    MCV      74.0 - 97.0 FL   88.0    MCH      24.0 - 34.0 PG   29.0    MCHC      31.0 - 37.0 g/dL   32.9    RDW      11.6 - 14.5 %   13.5    PLATELET      947 - 703 K/uL   220    MPV      9.2 - 11.8 FL   10.5    Cholesterol, total      <200 MG/DL  170     Triglyceride      <150 MG/DL  92     HDL Cholesterol      40 - 60 MG/DL  32 (L)     LDL, calculated      0 - 100 MG/DL  119.6 (H)     VLDL, calculated      MG/DL  18.4     CHOL/HDL Ratio      0 - 5.0    5.3 (H)     WBC      0 - 4 /hpf       RBC      0 - 5 /hpf       Epithelial cells      0 - 5 /lpf       Bacteria      NEG /hpf       Microalbumin,urine random      0 - 3.0 MG/DL       Creatinine, urine      30 - 125 mg/dL       Microalbumin/Creat.  Ratio      0 - 30 mg/g       Special Requests:             Culture result:             Hemoglobin A1c (POC)      %       TSH      0.36 - 3.74 uIU/mL    2.19   Hemoglobin A1c, (calculated)      4.2 - 5.6 %       Prostate Specific Ag      0.0 - 4.0 ng/mL       Digoxin level      0.9 - 2.0 NG/ML         Component      Latest Ref Rng & Units 4/23/2018 4/23/2018 4/23/2018          10:50 AM 10:50 AM 10:29 AM   Sodium      136 - 145 mmol/L  140    Potassium      3.5 - 5.5 mmol/L  5.0    Chloride      100 - 108 mmol/L  107    CO2      21 - 32 mmol/L  26    Anion gap      3.0 - 18 mmol/L  7    Glucose      74 - 99 mg/dL  117 (H)    BUN      7.0 - 18 MG/DL  17    Creatinine      0.6 - 1.3 MG/DL  1.35 (H)    BUN/Creatinine ratio      12 - 20    13    GFR est AA      >60 ml/min/1.73m2  >60    GFR est non-AA      >60 ml/min/1.73m2  55 (L)    Calcium      8.5 - 10.1 MG/DL  8.9    Bilirubin, total      0.2 - 1.0 MG/DL  0.5    ALT (SGPT)      16 - 61 U/L  17    AST      15 - 37 U/L  12 (L)    Alk.  phosphatase      45 - 117 U/L  108    Protein, total      6.4 - 8.2 g/dL  7.7    Albumin      3.4 - 5.0 g/dL  4.0    Globulin      2.0 - 4.0 g/dL  3.7    A-G Ratio      0.8 - 1.7    1.1    Color            Appearance            Specific gravity      1.005 - 1.030        pH (UA)      5.0 - 8.0        Protein      NEG mg/dL      Glucose      NEG mg/dL      Ketone      NEG mg/dL      Bilirubin      NEG        Blood      NEG        Urobilinogen      0.2 - 1.0 EU/dL      Nitrites      NEG        Leukocyte Esterase      NEG        WBC      4.6 - 13.2 K/uL      RBC      4.70 - 5.50 M/uL      HGB      13.0 - 16.0 g/dL      HCT      36.0 - 48.0 %      MCV      74.0 - 97.0 FL      MCH      24.0 - 34.0 PG      MCHC      31.0 - 37.0 g/dL      RDW      11.6 - 14.5 %      PLATELET      504 - 040 K/uL      MPV      9.2 - 11.8 FL      Cholesterol, total      <200 MG/     Triglyceride      <150 MG/DL 56     HDL Cholesterol      40 - 60 MG/DL 39 (L)     LDL, calculated      0 - 100 MG/.8 (H)     VLDL, calculated      MG/DL 11.2     CHOL/HDL Ratio      0 - 5.0   4.1     WBC      0 - 4 /hpf      RBC      0 - 5 /hpf      Epithelial cells      0 - 5 /lpf      Bacteria      NEG /hpf      Microalbumin,urine random      0 - 3.0 MG/DL      Creatinine, urine      30 - 125 mg/dL      Microalbumin/Creat. Ratio      0 - 30 mg/g      Special Requests:            Culture result:            Hemoglobin A1c (POC)      %   6.4   TSH      0.36 - 3.74 uIU/mL      Hemoglobin A1c, (calculated)      4.2 - 5.6 %      Prostate Specific Ag      0.0 - 4.0 ng/mL      Digoxin level      0.9 - 2.0 NG/ML        Previous labs    ASSESSMENT/PLAN:    1. Diabetes mellitus, stable (HCC):we will see what the labs show. Continue metformin, diet and exercise. -     metFORMIN (GLUCOPHAGE) 1,000 mg tablet; take 1 tablet by mouth twice a day after meals  -     TSH 3RD GENERATION; Future  -     METABOLIC PANEL, COMPREHENSIVE; Future  -     LIPID PANEL; Future  -     HEMOGLOBIN A1C WITH EAG; Future  -     MICROALBUMIN, UR, RAND W/ MICROALB/CREAT RATIO; Future  -     URINALYSIS W/ RFLX MICROSCOPIC; Future    2. Atrial fibrillation, unspecified type (HCC):stable. Continue lopressor and digoxin. -     metoprolol tartrate (LOPRESSOR) 25 mg tablet; TAKE 1/2 TABLETS BY MOUTH TWICE A DAY  -     CBC W/O DIFF; Future  -     DIGOXIN; Future    3. Vitamin D deficiency  -     VITAMIN D, 25 HYDROXY; Future    4. Screening PSA (prostate specific antigen)  -     PSA, DIAGNOSTIC (PROSTATE SPECIFIC AG); Future    Other orders  -     digoxin (DIGITEK) 0.125 mg tablet; take 1 tablet by mouth once daily      5.    Requested Prescriptions     Signed Prescriptions Disp Refills    metFORMIN (GLUCOPHAGE) 1,000 mg tablet 180 Tab 3     Sig: take 1 tablet by mouth twice a day after meals    metoprolol tartrate (LOPRESSOR) 25 mg tablet 90 Tab 3     Sig: TAKE 1/2 TABLETS BY MOUTH TWICE A DAY    digoxin (DIGITEK) 0.125 mg tablet 90 Tab 3     Sig: take 1 tablet by mouth once daily       6. Patient verbalized understanding and agreement with the plan. 7. Patient was given an after-visit summary. 8;   Follow-up and Dispositions    Return in about 6 months (around 4/2/2020) for F/U dm  or sooner if worsening symptoms.   ·                    Romina Holt MD

## 2019-10-02 NOTE — PROGRESS NOTES
ROOM # 1  Identified pt with two pt identifiers(name and ). Reviewed record in preparation for visit and have obtained necessary documentation. Chief Complaint   Patient presents with    Diabetes    Hypertension     f/u      Priyanka Cage preferred language for health care discussion is english/other. Is the patient using any DME equipment during OV? Serenity Vega is due for:  Health Maintenance Due   Topic    Pneumococcal 0-64 years (1 of 1 - PPSV23)    EYE EXAM RETINAL OR DILATED     Shingrix Vaccine Age 50> (1 of 2)    FOBT Q 1 YEAR AGE 54-65     FOOT EXAM Q1     HEMOGLOBIN A1C Q6M     Influenza Age 5 to Adult     MICROALBUMIN Q1     LIPID PANEL Q1      Health Maintenance reviewed and discussed per provider  Please order/place referral if appropriate. Advance Directive:  1. Do you have an advance directive in place? Patient Reply: NO    2. If not, would you like material regarding how to put one in place? NO    Coordination of Care:  1. Have you been to the ER, urgent care clinic since your last visit? Hospitalized since your last visit? NO    2. Have you seen or consulted any other health care providers outside of the [address] [place] [address] since your last visit? Include any pap smears or colon screening. NO    Patient is accompanied by self I have received verbal consent from Priyanka Cage to discuss any/all medical information while they are present in the room.     Learning Assessment:  Learning Assessment 2016   PRIMARY LEARNER Patient   PRIMARY LANGUAGE ENGLISH   LEARNER PREFERENCE PRIMARY DEMONSTRATION     READING   ANSWERED BY patient   RELATIONSHIP SELF     Depression Screening:  3 most recent PHQ Screens 2018   Little interest or pleasure in doing things Not at all Not at all Not at all Not at all Not at all Not at all   Feeling down, depressed, irritable, or hopeless Not at all Not at all Not at all Not at all Not at all Not at all   Total Score PHQ 2 0 0 0 0 0 0     Abuse Screening:  Abuse Screening Questionnaire 9/11/2018   Do you ever feel afraid of your partner? N   Are you in a relationship with someone who physically or mentally threatens you? N   Is it safe for you to go home?  Y     Fall Risk  n/i

## 2019-10-06 DIAGNOSIS — R80.9 PROTEINURIA, UNSPECIFIED TYPE: Primary | ICD-10-CM

## 2019-10-06 NOTE — PROGRESS NOTES
Please let pt know that labs were normal except:    1) vitamin D is low. Please start taking vitamin D3 2000 international units  Take one po daily OTC. 2) protein in urine. More than last year. Will refer to renal.     3) LDL is up at 131 and needs to be 100 or less since he is a DM. Is he will to try chol med? Work on diet and exercise. 4) digoxin level low. Did he take his digoxin the day he got his lab work? Has he been missing dosages?

## 2019-10-17 ENCOUNTER — TELEPHONE (OUTPATIENT)
Dept: INTERNAL MEDICINE CLINIC | Age: 54
End: 2019-10-17

## 2019-10-17 NOTE — TELEPHONE ENCOUNTER
Pt returned call he was advised of following results    1) vitamin D is low. Please start taking vitamin D3 2000 international units  Take one po daily OTC. 2) protein in urine. More than last year. Will refer to renal.     3) LDL is up at 131 and needs to be 100 or less since he is a DM. Is he will to try chol med? Work on diet and exercise. 4) digoxin level low. Did he take his digoxin the day he got his lab work? Has he been missing dosages? Pt verbalized understanding and asked if results could be mailed to home address    Will mail results.     Encounter closed

## 2020-06-22 ENCOUNTER — VIRTUAL VISIT (OUTPATIENT)
Dept: INTERNAL MEDICINE CLINIC | Age: 55
End: 2020-06-22

## 2020-06-22 DIAGNOSIS — I10 ESSENTIAL HYPERTENSION: Primary | ICD-10-CM

## 2020-06-22 DIAGNOSIS — I48.91 ATRIAL FIBRILLATION, UNSPECIFIED TYPE (HCC): ICD-10-CM

## 2020-06-22 DIAGNOSIS — E11.9 DIABETES MELLITUS, STABLE (HCC): ICD-10-CM

## 2020-06-22 RX ORDER — LANCETS
EACH MISCELLANEOUS
Qty: 100 EACH | Refills: 11 | Status: CANCELLED | OUTPATIENT
Start: 2020-06-22

## 2020-06-22 RX ORDER — METOPROLOL TARTRATE 25 MG/1
TABLET, FILM COATED ORAL
Qty: 90 TAB | Refills: 1 | Status: SHIPPED | OUTPATIENT
Start: 2020-06-22 | End: 2020-07-22 | Stop reason: SDUPTHER

## 2020-06-22 RX ORDER — DIGOXIN 125 MCG
TABLET ORAL
Qty: 90 TAB | Refills: 3 | Status: CANCELLED | OUTPATIENT
Start: 2020-06-22

## 2020-06-22 RX ORDER — METFORMIN HYDROCHLORIDE 1000 MG/1
TABLET ORAL
Qty: 180 TAB | Refills: 1 | Status: SHIPPED | OUTPATIENT
Start: 2020-06-22 | End: 2021-03-01 | Stop reason: SDUPTHER

## 2020-06-22 NOTE — PROGRESS NOTES
Manuel Barnhart is a 60 y.o. male who was seen by synchronous (real-time) audio-video technology on 6/22/2020. Consent: Manuel Barnhart, who was seen by synchronous (real-time) audio-video technology, and/or his healthcare decision maker, is aware that this patient-initiated, Telehealth encounter on 6/22/2020 is a billable service, with coverage as determined by his insurance carrier. He is aware that he may receive a bill and has provided verbal consent to proceed: Yes. Assessment & Plan:     Diagnoses and all orders for this visit:    1. Essential hypertension  -     metoprolol tartrate (LOPRESSOR) 25 mg tablet; TAKE 1/2 TABLETS BY MOUTH TWICE A DAY    2. Diabetes mellitus, stable (HCC)  -     metFORMIN (GLUCOPHAGE) 1,000 mg tablet; take 1 tablet by mouth twice a day after meals    3. Atrial fibrillation, unspecified type ([facility]  -     REFERRAL TO CARDIOLOGY   - for digoxin treatment- he reports he has some medication left to get him to cardiology. Health Maintenance Due   Topic Date Due    Pneumococcal 0-64 years (1 of 1 - PPSV23) 03/12/1969    Eye Exam Retinal or Dilated  03/12/1973    Shingrix Vaccine Age 50> (1 of 2) 03/12/2013    FOBT Q1Y Age 50-75  11/07/2017    Foot Exam Q1  05/23/2018       Follow-up and Dispositions    · Return in about 1 month (around 7/22/2020) for HTN, DM.     update lab and exam       Subjective:   Manuel Barnhart is a 60 y.o. male who was seen for Establish Care and Medication Refill    1) Establish care- Former patient of Dr Radha Choudhury     2) HTN-chronic issue for patient-  Metoprolol 25 mg BID- Patient has been without this medication for several days- reports compliance with regimen usually. Patient does not have a cardiologist- Patient denies side effects with current regimen. He also takes digoxin once a day 0.125 mg tablet. 3) DM type 2- chronic issue for patient. Metformin 1000 mg BID- Denies side effects with current treatment.  Checks his blood sugar reports morning fasting is 120. Denies hypoglycemia events. Prior to Admission medications    Medication Sig Start Date End Date Taking? Authorizing Provider   metFORMIN (GLUCOPHAGE) 1,000 mg tablet take 1 tablet by mouth twice a day after meals 6/22/20  Yes Lui Soria NP   metoprolol tartrate (LOPRESSOR) 25 mg tablet TAKE 1/2 TABLETS BY MOUTH TWICE A DAY 6/22/20  Yes Lui Soria NP   digoxin (DIGITEK) 0.125 mg tablet take 1 tablet by mouth once daily 10/2/19  Yes Destini Mathew MD   BLOOD-GLUCOSE METER ([address]) by Does Not Apply route. Yes Provider, Historical   glucose blood VI test strips (ASCENSIA AUTODISC VI, ONE TOUCH ULTRA TEST VI) strip Check fasting sugars before breakfast and dinner. One Touch Ultra mini meter 8/14/17  Yes Mike Falcon MD   Lancets misc Check fasting before breakfast and dinner. 8/7/17  Yes Mike Falcon MD   metFORMIN (GLUCOPHAGE) 1,000 mg tablet take 1 tablet by mouth twice a day after meals 10/2/19 6/22/20  Mike Falcon MD   metoprolol tartrate (LOPRESSOR) 25 mg tablet TAKE 1/2 TABLETS BY MOUTH TWICE A DAY 10/2/19 6/22/20  Isis Falcon MD     Allergies   Allergen Reactions    Pcn [Penicillins] Unable to Obtain     child       Past Medical History:   Diagnosis Date    A-fib ([facility]     Diabetes mellitus ([facility]     H/O ETOH abuse     Peripheral neuropathy     from DM    Vitamin D deficiency 7/2016       Review of Systems   Constitutional: Negative for chills, fever and malaise/fatigue. Eyes: Negative for blurred vision and double vision. Respiratory: Negative for cough, shortness of breath and wheezing. Cardiovascular: Negative for chest pain, palpitations and leg swelling. Genitourinary: Negative for frequency and urgency. Neurological: Positive for headaches (left sided and across head/ almost sinus pressure feeling ). Negative for dizziness. Endo/Heme/Allergies: Negative for polydipsia.        Objective: Vital Signs: (As obtained by patient/caregiver at home)  There were no vitals taken for this visit. [INSTRUCTIONS:  \"[x]\" Indicates a positive item  \"[]\" Indicates a negative item  -- DELETE ALL ITEMS NOT EXAMINED]    Constitutional: [x] Appears well-developed and well-nourished [x] No apparent distress      [] Abnormal -     Mental status: [x] Alert and awake  [x] Oriented to person/place/time [x] Able to follow commands    [] Abnormal -     Eyes:   EOM    [x]  Normal    [] Abnormal -   Sclera  [x]  Normal    [] Abnormal -          Discharge [x]  None visible   [] Abnormal -     HENT: [x] Normocephalic, atraumatic  [] Abnormal -   [x] Mouth/Throat: Mucous membranes are moist    External Ears [x] Normal  [] Abnormal -    Neck: [x] No visualized mass [] Abnormal -     Pulmonary/Chest: [x] Respiratory effort normal   [x] No visualized signs of difficulty breathing or respiratory distress        [] Abnormal -      Musculoskeletal:   [x] Normal gait with no signs of ataxia         [x] Normal range of motion of neck        [] Abnormal -     Neurological:        [x] No Facial Asymmetry (Cranial nerve 7 motor function) (limited exam due to video visit)          [x] No gaze palsy        [] Abnormal -          Skin:        [x] No significant exanthematous lesions or discoloration noted on facial skin         [] Abnormal -            Psychiatric:       [x] Normal Affect [] Abnormal -        [x] No Hallucinations    Other pertinent observable physical exam findings:-        We discussed the expected course, resolution and complications of the diagnosis(es) in detail. Medication risks, benefits, costs, interactions, and alternatives were discussed as indicated. I advised him to contact the office if his condition worsens, changes or fails to improve as anticipated. He expressed understanding with the diagnosis(es) and plan.        Noreen Wood is a 60 y.o. male who was evaluated by a video visit encounter for concerns as above. Patient identification was verified prior to start of the visit. A caregiver was present when appropriate. Due to this being a TeleHealth encounter (During [facility] public health emergency), evaluation of the following organ systems was limited: Vitals/Constitutional/EENT/Resp/CV/GI//MS/Neuro/Skin/Heme-Lymph-Imm. Pursuant to the emergency declaration under the [facility] Replaced by [facility] waiver authority and the [employer] Act, this Virtual  Visit was conducted, with patient's (and/or legal guardian's) consent, to reduce the patient's risk of exposure to COVID-19 and provide necessary medical care. Services were provided through a video synchronous discussion virtually to substitute for in-person clinic visit. Patient and provider were located at their individual homes.       David Jefferson NP

## 2020-06-22 NOTE — PROGRESS NOTES
For virtual visit patient would like to receive link via TEXT/EMAIL: ifrah Chang is a 60 y.o. male (: 1965) evaluated via telephone on 2020 to address:    Chief Complaint   Patient presents with   [facility]    Medication Refill       There were no vitals filed for this visit. Allergies Reviewed. Learning Assessment:     Learning Assessment 2016   PRIMARY LEARNER Patient   PRIMARY LANGUAGE ENGLISH   LEARNER PREFERENCE PRIMARY DEMONSTRATION     READING   ANSWERED BY patient   RELATIONSHIP SELF     Depression Screening:     3 most recent PHQ Screens 2018   Little interest or pleasure in doing things Not at all   Feeling down, depressed, irritable, or hopeless Not at all   Total Score PHQ 2 0     Fall Risk Assessment:   No flowsheet data found. Abuse Screening:     Abuse Screening Questionnaire 2018   Do you ever feel afraid of your partner? N   Are you in a relationship with someone who physically or mentally threatens you? N   Is it safe for you to go home? Y       Coordination of Care Questionaire:   1. Have you been to the ER, urgent care clinic since your last visit? Hospitalized since your last visit? NO    2. Have you seen or consulted any other health care providers outside of the [address] [place] [address] since your last visit? Include any pap smears or colon screening. YES podiatrist    Advanced Directive:   1. Do you have an Advanced Directive? NO    2. Would you like information on Advanced Directives?  NO

## 2020-07-22 ENCOUNTER — HOSPITAL ENCOUNTER (OUTPATIENT)
Dept: LAB | Age: 55
Discharge: HOME | End: 2020-07-22
Payer: COMMERCIAL

## 2020-07-22 ENCOUNTER — OFFICE VISIT (OUTPATIENT)
Dept: INTERNAL MEDICINE CLINIC | Age: 55
End: 2020-07-22

## 2020-07-22 VITALS
OXYGEN SATURATION: 99 % | HEART RATE: 64 BPM | HEIGHT: 68 IN | BODY MASS INDEX: 30.25 KG/M2 | WEIGHT: 199.6 LBS | SYSTOLIC BLOOD PRESSURE: 136 MMHG | DIASTOLIC BLOOD PRESSURE: 78 MMHG | TEMPERATURE: 97.8 F | RESPIRATION RATE: 18 BRPM

## 2020-07-22 DIAGNOSIS — Z13.89 SCREENING FOR CARDIOVASCULAR, RESPIRATORY, AND GENITOURINARY DISEASES: ICD-10-CM

## 2020-07-22 DIAGNOSIS — Z13.220 SCREENING FOR LIPID DISORDERS: ICD-10-CM

## 2020-07-22 DIAGNOSIS — Z13.29 SCREENING FOR THYROID DISORDER: ICD-10-CM

## 2020-07-22 DIAGNOSIS — I10 ESSENTIAL HYPERTENSION: ICD-10-CM

## 2020-07-22 DIAGNOSIS — Z12.5 SCREENING FOR PROSTATE CANCER: ICD-10-CM

## 2020-07-22 DIAGNOSIS — Z12.11 SCREENING FOR COLON CANCER: ICD-10-CM

## 2020-07-22 DIAGNOSIS — E55.9 VITAMIN D DEFICIENCY: ICD-10-CM

## 2020-07-22 DIAGNOSIS — Z13.89 SCREENING FOR GENITOURINARY CONDITION: ICD-10-CM

## 2020-07-22 DIAGNOSIS — Z13.6 SCREENING FOR CARDIOVASCULAR, RESPIRATORY, AND GENITOURINARY DISEASES: ICD-10-CM

## 2020-07-22 DIAGNOSIS — E11.40 TYPE 2 DIABETES MELLITUS WITH DIABETIC NEUROPATHY, WITHOUT LONG-TERM CURRENT USE OF INSULIN (HCC): Primary | ICD-10-CM

## 2020-07-22 DIAGNOSIS — Z13.83 SCREENING FOR CARDIOVASCULAR, RESPIRATORY, AND GENITOURINARY DISEASES: ICD-10-CM

## 2020-07-22 LAB
APPEARANCE UR: CLEAR
BILIRUB UR QL: NEGATIVE
COLOR UR: YELLOW
GLUCOSE UR STRIP.AUTO-MCNC: NEGATIVE MG/DL
HGB UR QL STRIP: NEGATIVE
KETONES UR QL STRIP.AUTO: NEGATIVE MG/DL
LEUKOCYTE ESTERASE UR QL STRIP.AUTO: NEGATIVE
NITRITE UR QL STRIP.AUTO: NEGATIVE
PH UR STRIP: 6 [PH] (ref 5–8)
PROT UR STRIP-MCNC: NEGATIVE MG/DL
SP GR UR REFRACTOMETRY: 1.01 (ref 1–1.03)
UROBILINOGEN UR QL STRIP.AUTO: 0.2 EU/DL (ref 0.2–1)

## 2020-07-22 PROCEDURE — 82274 ASSAY TEST FOR BLOOD FECAL: CPT

## 2020-07-22 PROCEDURE — 81003 URINALYSIS AUTO W/O SCOPE: CPT

## 2020-07-22 RX ORDER — METOPROLOL TARTRATE 25 MG/1
25 TABLET, FILM COATED ORAL 2 TIMES DAILY
Qty: 60 TAB | Refills: 2 | Status: SHIPPED | OUTPATIENT
Start: 2020-07-22 | End: 2021-03-01 | Stop reason: SDUPTHER

## 2020-07-22 NOTE — PROGRESS NOTES
ROOM # 2  Identified pt with two pt identifiers(name and ). Reviewed record in preparation for visit and have obtained necessary documentation. Chief Complaint   Patient presents with    Diabetes    Hypertension      Huy Carter preferred language for health care discussion is english/other. Is the patient using any DME equipment during OV? NO    Huy Carter is due for:  Health Maintenance Due   Topic    Pneumococcal 0-64 years (1 of 1 - PPSV23)    Eye Exam Retinal or Dilated     Shingrix Vaccine Age 50> (1 of 2)    FOBT Q1Y Age 54-65     Foot Exam Q1      Health Maintenance reviewed and discussed per provider  Please order/place referral if appropriate. Advance Directive:  1. Do you have an advance directive in place? Patient Reply: NO    2. If not, would you like material regarding how to put one in place? NO    Coordination of Care:  1. Have you been to the ER, urgent care clinic since your last visit? Hospitalized since your last visit? NO    2. Have you seen or consulted any other health care providers outside of the [address] [place] [address] since your last visit? Include any pap smears or colon screening. NO    Patient is accompanied by self I have received verbal consent from Huy Carter to discuss any/all medical information while they are present in the room.     Learning Assessment:  Learning Assessment 2016   PRIMARY LEARNER Patient   PRIMARY LANGUAGE ENGLISH   LEARNER PREFERENCE PRIMARY DEMONSTRATION     READING   ANSWERED BY patient   RELATIONSHIP SELF     Depression Screening:  3 most recent PHQ Screens 2018   Little interest or pleasure in doing things Not at all Not at all Not at all Not at all Not at all Not at all   Feeling down, depressed, irritable, or hopeless Not at all Not at all Not at all Not at all Not at all Not at all   Total Score PHQ 2 0 0 0 0 0 0     Abuse Screening:  Abuse Screening Questionnaire 9/11/2018   Do you ever feel afraid of your partner? N   Are you in a relationship with someone who physically or mentally threatens you? N   Is it safe for you to go home?  Y     Fall Risk  n/i

## 2020-07-22 NOTE — PROGRESS NOTES
HISTORY OF PRESENT ILLNESS  Benji Arriola is a 60 y.o. male. Has appointment with cardiology next Wednesday. Has eye specialist appointment with August 11th. Has a podiatrist he sees for diabatic foot exams. Reports he thinks his colonoscopy was at 46years old. He will call [facility] and make sure of the date. HPI  1) HTN- this is a chronic issue- Taking metoprolol 25 mg BID- reports compliance with current regimen. Denies SOB,chest pain, leg swelling. BP Readings from Last 3 Encounters:   07/22/20 136/78   10/02/19 133/75   09/11/18 132/80       2) DM- this is a chronic issue- Taking metformin 1000 mg BID- reports compliance with regimen. Denies hypoglycemia events. He checks his sugars seldom as he reports his numbers are good. Lab Results   Component Value Date/Time    Hemoglobin A1c 5.9 (H) 10/02/2019 11:33 AM    Hemoglobin A1c 6.7 (H) 09/11/2018 09:53 AM    Hemoglobin A1c 5.9 (H) 08/14/2017 09:12 AM       /78   Pulse 64   Temp 97.8 °F (36.6 °C) (Tympanic)   Resp 18   Ht 5' 8\" (1.727 m)   Wt 199 lb 9.6 oz (90.5 kg)   SpO2 99%   BMI 30.35 kg/m²       Past Medical History:   Diagnosis Date    [facility]fib ([facility]     Diabetes mellitus ([facility]     H/O ETOH abuse     Peripheral neuropathy     from DM    Vitamin D deficiency 7/2016       Review of Systems   Constitutional: Negative for chills, fever and malaise/fatigue. Eyes: Negative for blurred vision and double vision. Respiratory: Negative for cough, shortness of breath and wheezing. Cardiovascular: Negative for chest pain, palpitations and leg swelling. Gastrointestinal: Negative for abdominal pain, nausea and vomiting. Genitourinary: Negative for frequency and urgency. Neurological: Negative for dizziness and headaches. Endo/Heme/Allergies: Negative for polydipsia. Physical Exam  Vitals signs and nursing note reviewed. Constitutional:       General: He is not in acute distress. Appearance: He is not ill-appearing.    HENT: Head: Normocephalic. Right Ear: Tympanic membrane, ear canal and external ear normal.      Left Ear: Tympanic membrane, ear canal and external ear normal.      Nose: Nose normal.      Mouth/Throat:      Mouth: Mucous membranes are moist.      Pharynx: Oropharynx is clear. Eyes:      General: No scleral icterus. Extraocular Movements: Extraocular movements intact. Conjunctiva/sclera: Conjunctivae normal.      Pupils: Pupils are equal, round, and reactive to light. Cardiovascular:      Rate and Rhythm: Normal rate and regular rhythm. Pulses: Normal pulses. Heart sounds: Normal heart sounds. Pulmonary:      Effort: Pulmonary effort is normal. No respiratory distress. Breath sounds: Normal breath sounds. No wheezing. Musculoskeletal: Normal range of motion. Right lower leg: No edema. Left lower leg: No edema. Lymphadenopathy:      Cervical: No cervical adenopathy. Skin:     General: Skin is warm and dry. Findings: No lesion or rash. Neurological:      General: No focal deficit present. Mental Status: He is alert and oriented to person, place, and time. Psychiatric:         Mood and Affect: Mood normal.         Behavior: Behavior normal.         Thought Content: Thought content normal.         Judgment: Judgment normal.         ASSESSMENT and PLAN  Diagnoses and all orders for this visit:    1. Type 2 diabetes mellitus with diabetic neuropathy, without long-term current use of insulin (HCC)  -     HEMOGLOBIN A1C WITH EAG; Future   - DM- stable - Controlled per previous A1C's-  continue current treatment     2. Essential hypertension  -     metoprolol tartrate (LOPRESSOR) 25 mg tablet; Take 1 Tab by mouth two (2) times a day. - BP - up at first, came down with rest- continue current treatment     3. Vitamin D deficiency  -     VITAMIN D, 25 HYDROXY; Future    4.  Screening for cardiovascular, respiratory, and genitourinary diseases  -     CBC WITH AUTOMATED DIFF; Future  -     METABOLIC PANEL, COMPREHENSIVE; Future    5. Screening for colon cancer  -     OCCULT BLOOD IMMUNOASSAY,DIAGNOSTIC; Future    6. Screening for thyroid disorder  -     TSH AND FREE T4; Future    7. Screening for genitourinary condition  -     URINALYSIS W/ RFLX MICROSCOPIC; Future    8. Screening for lipid disorders  -     LIPID PANEL; Future    9. Screening for prostate cancer  -     PSA SCREENING (SCREENING); Future      Follow-up and Dispositions    · Return in about 3 months (around 10/22/2020) for HTN, DM.

## 2020-07-23 ENCOUNTER — HOSPITAL ENCOUNTER (OUTPATIENT)
Dept: LAB | Age: 55
Discharge: HOME | End: 2020-07-23
Payer: COMMERCIAL

## 2020-07-23 DIAGNOSIS — Z13.6 SCREENING FOR CARDIOVASCULAR, RESPIRATORY, AND GENITOURINARY DISEASES: ICD-10-CM

## 2020-07-23 DIAGNOSIS — E11.40 TYPE 2 DIABETES MELLITUS WITH DIABETIC NEUROPATHY, WITHOUT LONG-TERM CURRENT USE OF INSULIN (HCC): ICD-10-CM

## 2020-07-23 DIAGNOSIS — Z13.89 SCREENING FOR CARDIOVASCULAR, RESPIRATORY, AND GENITOURINARY DISEASES: ICD-10-CM

## 2020-07-23 DIAGNOSIS — Z13.220 SCREENING FOR LIPID DISORDERS: ICD-10-CM

## 2020-07-23 DIAGNOSIS — E55.9 VITAMIN D DEFICIENCY: ICD-10-CM

## 2020-07-23 DIAGNOSIS — Z13.83 SCREENING FOR CARDIOVASCULAR, RESPIRATORY, AND GENITOURINARY DISEASES: ICD-10-CM

## 2020-07-23 DIAGNOSIS — Z12.5 SCREENING FOR PROSTATE CANCER: ICD-10-CM

## 2020-07-23 DIAGNOSIS — Z13.29 SCREENING FOR THYROID DISORDER: ICD-10-CM

## 2020-07-23 LAB
25(OH)D3 SERPL-MCNC: 20.1 NG/ML (ref 30–100)
ALBUMIN SERPL-MCNC: 3.7 G/DL (ref 3.4–5)
ALBUMIN/GLOB SERPL: 1 {RATIO} (ref 0.8–1.7)
ALP SERPL-CCNC: 108 U/L (ref 45–117)
ALT SERPL-CCNC: 13 U/L (ref 16–61)
ANION GAP SERPL CALC-SCNC: 6 MMOL/L (ref 3–18)
AST SERPL-CCNC: 9 U/L (ref 10–38)
BASOPHILS # BLD: 0 K/UL (ref 0–0.1)
BASOPHILS NFR BLD: 1 % (ref 0–2)
BILIRUB SERPL-MCNC: 0.7 MG/DL (ref 0.2–1)
BUN SERPL-MCNC: 19 MG/DL (ref 7–18)
BUN/CREAT SERPL: 15 (ref 12–20)
CALCIUM SERPL-MCNC: 9.1 MG/DL (ref 8.5–10.1)
CHLORIDE SERPL-SCNC: 107 MMOL/L (ref 100–111)
CHOLEST SERPL-MCNC: 165 MG/DL
CO2 SERPL-SCNC: 27 MMOL/L (ref 21–32)
CREAT SERPL-MCNC: 1.28 MG/DL (ref 0.6–1.3)
DIFFERENTIAL METHOD BLD: ABNORMAL
EOSINOPHIL # BLD: 0.2 K/UL (ref 0–0.4)
EOSINOPHIL NFR BLD: 4 % (ref 0–5)
ERYTHROCYTE [DISTWIDTH] IN BLOOD BY AUTOMATED COUNT: 13.6 % (ref 11.6–14.5)
EST. AVERAGE GLUCOSE BLD GHB EST-MCNC: 114 MG/DL
GLOBULIN SER CALC-MCNC: 3.8 G/DL (ref 2–4)
GLUCOSE SERPL-MCNC: 110 MG/DL (ref 74–99)
HBA1C MFR BLD: 5.6 % (ref 4.2–5.6)
HCT VFR BLD AUTO: 40.2 % (ref 36–48)
HDLC SERPL-MCNC: 41 MG/DL (ref 40–60)
HDLC SERPL: 4 {RATIO} (ref 0–5)
HGB BLD-MCNC: 12.7 G/DL (ref 13–16)
LDLC SERPL CALC-MCNC: 114.4 MG/DL (ref 0–100)
LIPID PROFILE,FLP: ABNORMAL
LYMPHOCYTES # BLD: 0.9 K/UL (ref 0.9–3.6)
LYMPHOCYTES NFR BLD: 17 % (ref 21–52)
MCH RBC QN AUTO: 28.3 PG (ref 24–34)
MCHC RBC AUTO-ENTMCNC: 31.6 G/DL (ref 31–37)
MCV RBC AUTO: 89.7 FL (ref 74–97)
MONOCYTES # BLD: 0.4 K/UL (ref 0.05–1.2)
MONOCYTES NFR BLD: 7 % (ref 3–10)
NEUTS SEG # BLD: 3.9 K/UL (ref 1.8–8)
NEUTS SEG NFR BLD: 71 % (ref 40–73)
PLATELET # BLD AUTO: 236 K/UL (ref 135–420)
PMV BLD AUTO: 10.6 FL (ref 9.2–11.8)
POTASSIUM SERPL-SCNC: 5 MMOL/L (ref 3.5–5.5)
PROT SERPL-MCNC: 7.5 G/DL (ref 6.4–8.2)
PSA SERPL-MCNC: 0.5 NG/ML (ref 0–4)
RBC # BLD AUTO: 4.48 M/UL (ref 4.7–5.5)
SODIUM SERPL-SCNC: 140 MMOL/L (ref 136–145)
T4 FREE SERPL-MCNC: 1.2 NG/DL (ref 0.7–1.5)
TRIGL SERPL-MCNC: 48 MG/DL (ref ?–150)
TSH SERPL DL<=0.05 MIU/L-ACNC: 3.19 UIU/ML (ref 0.36–3.74)
VLDLC SERPL CALC-MCNC: 9.6 MG/DL
WBC # BLD AUTO: 5.4 K/UL (ref 4.6–13.2)

## 2020-07-23 PROCEDURE — 36415 COLL VENOUS BLD VENIPUNCTURE: CPT

## 2020-07-23 PROCEDURE — 85025 COMPLETE CBC W/AUTO DIFF WBC: CPT

## 2020-07-23 PROCEDURE — 84153 ASSAY OF PSA TOTAL: CPT

## 2020-07-23 PROCEDURE — 83036 HEMOGLOBIN GLYCOSYLATED A1C: CPT

## 2020-07-23 PROCEDURE — 80061 LIPID PANEL: CPT

## 2020-07-23 PROCEDURE — 80053 COMPREHEN METABOLIC PANEL: CPT

## 2020-07-23 PROCEDURE — 84439 ASSAY OF FREE THYROXINE: CPT

## 2020-07-23 PROCEDURE — 82306 VITAMIN D 25 HYDROXY: CPT

## 2020-07-25 LAB — HEMOCCULT STL QL IA: NEGATIVE

## 2020-07-27 DIAGNOSIS — E55.9 VITAMIN D DEFICIENCY: Primary | ICD-10-CM

## 2020-07-27 RX ORDER — ERGOCALCIFEROL 1.25 MG/1
50000 CAPSULE ORAL
Qty: 4 CAP | Refills: 0 | Status: SHIPPED | OUTPATIENT
Start: 2020-07-27 | End: 2021-03-01 | Stop reason: ALTCHOICE

## 2020-07-27 NOTE — PROGRESS NOTES
Results reviewed. Please call patient with results. His LDL cholesterol is elevated please have him work on diet and exercise. A1C is normal great job! Vitamin D is low I will send 50,000 I U of vitamin D weekly for four weeks then he will need to add OTC vitamin D 2,000 I U daily once his prescription is finished. All other labs are stable or within acceptable ranges. Please stay hydrated.

## 2020-07-28 NOTE — PROGRESS NOTES
Patient transported to [facility], [address] [facility]  08/25/19 1946 Spoke with patient and 2 patient identifiers was confirmed. Patient was given results below and verbalized understanding . Patient has no questions/concerns  at this time.

## 2020-07-29 ENCOUNTER — OFFICE VISIT (OUTPATIENT)
Dept: CARDIOLOGY CLINIC | Age: 55
End: 2020-07-29

## 2020-07-29 VITALS
WEIGHT: 196 LBS | HEIGHT: 68 IN | SYSTOLIC BLOOD PRESSURE: 123 MMHG | TEMPERATURE: 97.8 F | BODY MASS INDEX: 29.7 KG/M2 | DIASTOLIC BLOOD PRESSURE: 71 MMHG | OXYGEN SATURATION: 98 % | HEART RATE: 68 BPM

## 2020-07-29 DIAGNOSIS — I48.0 PAF (PAROXYSMAL ATRIAL FIBRILLATION) (HCC): ICD-10-CM

## 2020-07-29 DIAGNOSIS — E11.40 TYPE 2 DIABETES MELLITUS WITH DIABETIC NEUROPATHY, WITHOUT LONG-TERM CURRENT USE OF INSULIN (HCC): ICD-10-CM

## 2020-07-29 DIAGNOSIS — I48.91 ATRIAL FIBRILLATION, UNSPECIFIED TYPE (HCC): Primary | ICD-10-CM

## 2020-07-29 DIAGNOSIS — F10.11 H/O ETOH ABUSE: ICD-10-CM

## 2020-07-29 DIAGNOSIS — I10 ESSENTIAL HYPERTENSION: ICD-10-CM

## 2020-07-29 DIAGNOSIS — N11.8 XGP (XANTHOGRANULOMATOUS PYELONEPHRITIS): ICD-10-CM

## 2020-07-29 NOTE — PATIENT INSTRUCTIONS
Testing Echo 
48 HR Holter Please call [facility] central scheduling at [phone]  to schedule an appointment. All testing is completed at [address] [facility] **call office three days after testing for results**

## 2020-07-29 NOTE — PROGRESS NOTES
Herberth Higgins presents today for   Chief Complaint   Patient presents with    New Patient       Herberth Higgins preferred language for health care discussion is english/other. Is someone accompanying this pt? No     Is the patient using any DME equipment during OV? No     Depression Screening:  3 most recent PHQ Screens 7/29/2020   Little interest or pleasure in doing things Not at all   Feeling down, depressed, irritable, or hopeless Not at all   Total Score PHQ 2 0       Learning Assessment:  Learning Assessment 6/30/2016   PRIMARY LEARNER Patient   PRIMARY LANGUAGE ENGLISH   LEARNER PREFERENCE PRIMARY DEMONSTRATION     READING   ANSWERED BY patient   RELATIONSHIP SELF       Abuse Screening:  Abuse Screening Questionnaire 9/11/2018   Do you ever feel afraid of your partner? N   Are you in a relationship with someone who physically or mentally threatens you? N   Is it safe for you to go home? Y       Fall Risk  No flowsheet data found. Pt currently taking Anticoagulant therapy? no    Coordination of Care:  1. Have you been to the ER, urgent care clinic since your last visit? Hospitalized since your last visit?  no    2. Have you seen or consulted any other health care providers outside of the [address] [place] [address] since your last visit? Include any pap smears or colon screening.  no

## 2020-08-04 PROBLEM — I48.0 PAF (PAROXYSMAL ATRIAL FIBRILLATION) (HCC): Status: ACTIVE | Noted: 2020-08-04

## 2020-08-04 PROBLEM — I10 ESSENTIAL HYPERTENSION: Status: ACTIVE | Noted: 2020-08-04

## 2020-08-04 NOTE — PROGRESS NOTES
Subjective:      Rita Basilio is in the office today for cardiac evaluation. He is a 61-year-old hypertensive diabetic man with a 4 to 5-year history of atrial fibrillation which appears likely to be on a paroxysmal basis. He is presently in sinus rhythm today. He has been on daily digoxin since the time of his original diagnosis. The patient is mostly asymptomatic. He does report occasional \"slight fluttering \", which he actually experienced several days prior to this evaluation. He has had no dizziness, lightheadedness, near syncope or syncope. He denies shortness of breath at rest or with or with exertion. He has had no PND or orthopnea. He has not had chest pain. He has no history of sleep disorder. He does have a history of excessive which ended about 4 or 5 years ago. Patient's cardiac risk factors are mild dyslipidemia, diabetes mellitus, hypertension. 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 4 Cap 0    metoprolol tartrate (LOPRESSOR) 25 mg tablet Take 1 Tab by mouth two (2) times a day.  60 Tab 2    metFORMIN (GLUCOPHAGE) 1,000 mg tablet take 1 tablet by mouth twice a day after meals 180 Tab 1    digoxin (DIGITEK) 0.125 mg tablet take 1 tablet by mouth once daily 90 Tab 3    BLOOD-GLUCOSE METER ([employer] Veg [address]) by Does Not Apply route.  glucose blood VI test strips (ASCENSIA AUTODISC VI, ONE TOUCH ULTRA TEST VI) strip Check fasting sugars before breakfast and dinner. One Touch Ultra mini meter 100 Strip 11    Lancets misc Check fasting before breakfast and dinner. 100 Each 11     Allergies   Allergen Reactions    Pcn [Penicillins] Unable to Obtain     child     Past Medical History:   Diagnosis Date    [facility]fib ([facility]     Diabetes mellitus ([facility]     Essential hypertension 8/4/2020    H/O ETOH abuse     Peripheral neuropathy     from DM    Vitamin D deficiency 7/2016     Past Surgical History:   Procedure Laterality Date    HX NEPHRECTOMY Left 05/2016    Dr. Lorenzo Heading     Family History   Problem Relation Age of Onset   Glynn Kindler Cancer Mother         lung cancer    Cancer Father         brain and liver ca     Social History     Tobacco Use   Smoking Status Never Smoker   Smokeless Tobacco Never Used   Tobacco Comment    Pt counseled to continue to not smoke. Review of Systems, additional:  Constitutional: negative  Eyes: negative  Respiratory: negative  Cardiovascular: positive for palpitations  Gastrointestinal: negative  Musculoskeletal:negative  Neurological: negative  Behvioral/Psych: negative  Endocrine: negative  ENT: negative    Objective:     Visit Vitals  /71   Pulse 68   Temp 97.8 °F (36.6 °C) (Oral)   Ht 5' 8\" (1.727 m)   Wt 196 lb (88.9 kg)   SpO2 98%   BMI 29.80 kg/m²     General:  alert, cooperative, no distress   Chest Wall: inspection normal - no chest wall deformities or tenderness, respiratory effort normal   Lung: clear to auscultation bilaterally   Heart:  normal rate and regular rhythm, S1 and S2 normal, no murmurs noted, no gallops noted, no JVD   Abdomen: soft, non-tender.  Bowel sounds normal. No masses,  no organomegaly   Extremities: extremities normal, atraumatic, no cyanosis or edema Skin: no rashes   Neuro: alert, oriented, normal speech, no focal findings or movement disorder noted [id number]. Sinus bradycardia. Within normal limits. Assessment/Plan:       ICD-10-CM ICD-9-CM    1. PAF (paroxysmal atrial fibrillation) ([facility]) unspecified type ([facility]) , [id number] score 2, patient in sinus rhythm in the office today. Will order baseline echocardiogram and 48-hour Holter monitor. Discontinue digoxin. Return in 3 to 4 weeks. I48.0 427.31 AMB POC EKG ROUTINE W/ 12 LEADS, INTER & REP      ECHO ADULT COMPLETE      CARDIAC HOLTER MONITOR   2. Type 2 diabetes mellitus with diabetic neuropathy, without long-term current use of insulin ([facility])  E11.40 250.60      357.2    3. Essential hypertension, controlled in the office today I10 401.9           4. XGP (xanthogranulomatous pyelonephritis), s/p left hand assisted lap nephrectomy converted to open on 16  N11.8 590.80    5.  H/O ETOH abuse , none per patient x45 years F10.11 305.03

## 2021-03-01 ENCOUNTER — OFFICE VISIT (OUTPATIENT)
Dept: INTERNAL MEDICINE CLINIC | Age: 56
End: 2021-03-01
Payer: COMMERCIAL

## 2021-03-01 VITALS
HEART RATE: 69 BPM | WEIGHT: 215 LBS | TEMPERATURE: 97.8 F | SYSTOLIC BLOOD PRESSURE: 146 MMHG | HEIGHT: 68 IN | DIASTOLIC BLOOD PRESSURE: 81 MMHG | OXYGEN SATURATION: 100 % | BODY MASS INDEX: 32.58 KG/M2 | RESPIRATION RATE: 16 BRPM

## 2021-03-01 DIAGNOSIS — I10 ESSENTIAL HYPERTENSION: ICD-10-CM

## 2021-03-01 DIAGNOSIS — Z76.0 MEDICATION REFILL: ICD-10-CM

## 2021-03-01 DIAGNOSIS — E11.9 DIABETES MELLITUS, STABLE (HCC): Primary | ICD-10-CM

## 2021-03-01 PROCEDURE — 99214 OFFICE O/P EST MOD 30 MIN: CPT | Performed by: NURSE PRACTITIONER

## 2021-03-01 RX ORDER — METOPROLOL TARTRATE 25 MG/1
25 TABLET, FILM COATED ORAL DAILY
Qty: 90 TAB | Refills: 1 | Status: SHIPPED | OUTPATIENT
Start: 2021-03-01 | End: 2021-10-21

## 2021-03-01 RX ORDER — METFORMIN HYDROCHLORIDE 1000 MG/1
TABLET ORAL
Qty: 180 TAB | Refills: 1 | Status: SHIPPED | OUTPATIENT
Start: 2021-03-01 | End: 2021-11-01 | Stop reason: SDUPTHER

## 2021-03-01 NOTE — PROGRESS NOTES
HISTORY OF PRESENT ILLNESS  Melody Naylor is a 60 y.o. male. Here for HTN and DM management. Hypertension   The history is provided by the patient. This is a chronic problem. The current episode started more than 1 week ago. The problem has not changed since onset. Pertinent negatives include no chest pain, no palpitations, no malaise/fatigue, no blurred vision, no headaches, no dizziness and no shortness of breath. Risk factors include diabetes mellitus. Diabetes  The history is provided by the patient. This is a chronic problem. The current episode started more than 1 week ago. The problem occurs daily. The problem has not changed since onset. Pertinent negatives include no chest pain, no headaches and no shortness of breath. The symptoms are relieved by medications. The treatment provided moderate relief. BP Readings from Last 3 Encounters:   03/01/21 (!) 146/81   07/29/20 123/71   07/22/20 136/78     Lab Results   Component Value Date/Time    Hemoglobin A1c 5.6 07/23/2020 08:28 AM    Hemoglobin A1c 5.9 (H) 10/02/2019 11:33 AM    Hemoglobin A1c 6.7 (H) 09/11/2018 09:53 AM       BP (!) 146/81   Pulse 69   Temp 97.8 °F (36.6 °C) (Temporal)   Resp 16   Ht 5' 8\" (1.727 m)   Wt 215 lb (97.5 kg)   SpO2 100%   BMI 32.69 kg/m²   Current Outpatient Medications   Medication Sig Dispense Refill    metFORMIN (GLUCOPHAGE) 1,000 mg tablet take 1 tablet by mouth twice a day after meals 180 Tab 1    metoprolol tartrate (LOPRESSOR) 25 mg tablet Take 1 Tab by mouth daily. 90 Tab 1    BLOOD-GLUCOSE METER (Zach Paz [address]) by Does Not Apply route.  glucose blood VI test strips (ASCENSIA AUTODISC VI, ONE TOUCH ULTRA TEST VI) strip Check fasting sugars before breakfast and dinner. One Touch Ultra mini meter 100 Strip 11    Lancets misc Check fasting before breakfast and dinner. 100 Each 11       Review of Systems   Constitutional: Negative for chills, fever and malaise/fatigue.    Eyes: Negative for blurred vision and double vision. Respiratory: Negative for cough, shortness of breath and wheezing. Cardiovascular: Negative for chest pain, palpitations and leg swelling. Neurological: Negative for dizziness and headaches. Physical Exam  Vitals signs and nursing note reviewed. Constitutional:       General: He is not in acute distress. Appearance: He is not ill-appearing. HENT:      Head: Normocephalic. Right Ear: External ear normal.      Left Ear: External ear normal.      Mouth/Throat:      Comments: MASK    Eyes:      General: No scleral icterus. Extraocular Movements: Extraocular movements intact. Conjunctiva/sclera: Conjunctivae normal.      Pupils: Pupils are equal, round, and reactive to light. Neck:      Musculoskeletal: Normal range of motion. Cardiovascular:      Rate and Rhythm: Normal rate and regular rhythm. Pulses: Normal pulses. Heart sounds: Normal heart sounds. Pulmonary:      Effort: Pulmonary effort is normal. No respiratory distress. Breath sounds: Normal breath sounds. No wheezing. Musculoskeletal: Normal range of motion. Right lower leg: No edema. Left lower leg: No edema. Skin:     General: Skin is warm and dry. Findings: No lesion or rash. Neurological:      General: No focal deficit present. Mental Status: He is alert and oriented to person, place, and time. Psychiatric:         Mood and Affect: Mood normal.         Behavior: Behavior normal.         Thought Content: Thought content normal.         Judgment: Judgment normal.       ASSESSMENT and PLAN  Diagnoses and all orders for this visit:    1. Diabetes mellitus, stable (HCC)  -     MICROALBUMIN, UR, RAND W/ MICROALB/CREAT RATIO; Future  -     HEMOGLOBIN A1C WITH EAG; Future  -     METABOLIC PANEL, COMPREHENSIVE;  Future  -     metFORMIN (GLUCOPHAGE) 1,000 mg tablet; take 1 tablet by mouth twice a day after meals   - controlled continue current treatment 2. Essential hypertension  -     metoprolol tartrate (LOPRESSOR) 25 mg tablet; Take 1 Tab by mouth daily. - elevated today - restart medications has been out. 3. Medication refill  -     metFORMIN (GLUCOPHAGE) 1,000 mg tablet; take 1 tablet by mouth twice a day after meals  -     metoprolol tartrate (LOPRESSOR) 25 mg tablet; Take 1 Tab by mouth daily. Follow-up and Dispositions    · Return in about 3 months (around 6/1/2021) for HTN, DM.

## 2021-03-01 NOTE — PROGRESS NOTES
ROOM # 2    Bryanna Carrillo presents today for   Chief Complaint   Patient presents with    Follow Up Chronic Condition     X 6 MO HTM, DM        Bryanna Carrillo preferred language for health care discussion is english/other. Is someone accompanying this pt? NO    Is the patient using any DME equipment during OV? NO    Depression Screening:  3 most recent [facility] Screens 3/1/2021 7/29/2020 7/22/2020 9/11/2018 9/11/2018 4/23/2018 8/14/2017   Little interest or pleasure in doing things Not at all Not at all Not at all Not at all Not at all Not at all Not at all   Feeling down, depressed, irritable, or hopeless Not at all Not at all Not at all Not at all Not at all Not at all Not at all   Total Score PHQ 2 0 0 0 0 0 0 0       Learning Assessment:  Learning Assessment 6/30/2016   PRIMARY LEARNER Patient   PRIMARY LANGUAGE ENGLISH   LEARNER PREFERENCE PRIMARY DEMONSTRATION     READING   ANSWERED BY patient   RELATIONSHIP SELF       Abuse Screening:  Abuse Screening Questionnaire 9/11/2018   Do you ever feel afraid of your partner? N   Are you in a relationship with someone who physically or mentally threatens you? N   Is it safe for you to go home? Y       Fall Risk  No flowsheet data found. Health Maintenance reviewed and discussed per provider. Yes    Bryanna Carrillo is due for   Health Maintenance Due   Topic Date Due    Pneumococcal 0-64 years (1 of 1 - PPSV23) 03/12/1969    Eye Exam Retinal or Dilated  03/12/1973    COVID-19 Vaccine (1 of 2) 03/12/1979    Shingrix Vaccine Age 50> (1 of 2) 03/12/2013    Foot Exam Q1  05/23/2018    Flu Vaccine (1) 09/01/2020    MICROALBUMIN Q1  10/02/2020         Please order/place referral if appropriate. Advance Directive:  1. Do you have an advance directive in place? Patient Reply: NO    2. If not, would you like material regarding how to put one in place? Patient Reply: NO    Coordination of Care:  1. Have you been to the ER, urgent care clinic since your last visit? Hospitalized since your last visit? NO    2. Have you seen or consulted any other health care providers outside of the [address] [place] [address] since your last visit? Include any pap smears or colon screening.  NO

## 2021-03-02 ENCOUNTER — HOSPITAL ENCOUNTER (OUTPATIENT)
Dept: LAB | Age: 56
Discharge: HOME | End: 2021-03-02
Payer: COMMERCIAL

## 2021-03-02 DIAGNOSIS — E11.9 DIABETES MELLITUS, STABLE (HCC): ICD-10-CM

## 2021-03-02 LAB
ALBUMIN SERPL-MCNC: 4.2 G/DL (ref 3.4–5)
ALBUMIN/GLOB SERPL: 1.1 {RATIO} (ref 0.8–1.7)
ALP SERPL-CCNC: 122 U/L (ref 45–117)
ALT SERPL-CCNC: 21 U/L (ref 16–61)
ANION GAP SERPL CALC-SCNC: 7 MMOL/L (ref 3–18)
AST SERPL-CCNC: 14 U/L (ref 10–38)
BILIRUB SERPL-MCNC: 0.7 MG/DL (ref 0.2–1)
BUN SERPL-MCNC: 23 MG/DL (ref 7–18)
BUN/CREAT SERPL: 14 (ref 12–20)
CALCIUM SERPL-MCNC: 8.9 MG/DL (ref 8.5–10.1)
CHLORIDE SERPL-SCNC: 103 MMOL/L (ref 100–111)
CO2 SERPL-SCNC: 26 MMOL/L (ref 21–32)
CREAT SERPL-MCNC: 1.6 MG/DL (ref 0.6–1.3)
CREAT UR-MCNC: 74 MG/DL (ref 30–125)
EST. AVERAGE GLUCOSE BLD GHB EST-MCNC: 117 MG/DL
GLOBULIN SER CALC-MCNC: 4 G/DL (ref 2–4)
GLUCOSE SERPL-MCNC: 76 MG/DL (ref 74–99)
HBA1C MFR BLD: 5.7 % (ref 4.2–5.6)
MICROALBUMIN UR-MCNC: 5.86 MG/DL (ref 0–3)
MICROALBUMIN/CREAT UR-RTO: 79 MG/G (ref 0–30)
POTASSIUM SERPL-SCNC: 5.2 MMOL/L (ref 3.5–5.5)
PROT SERPL-MCNC: 8.2 G/DL (ref 6.4–8.2)
SODIUM SERPL-SCNC: 136 MMOL/L (ref 136–145)

## 2021-03-02 PROCEDURE — 82043 UR ALBUMIN QUANTITATIVE: CPT

## 2021-03-02 PROCEDURE — 80053 COMPREHEN METABOLIC PANEL: CPT

## 2021-03-02 PROCEDURE — 36415 COLL VENOUS BLD VENIPUNCTURE: CPT

## 2021-03-02 PROCEDURE — 83036 HEMOGLOBIN GLYCOSYLATED A1C: CPT

## 2021-03-03 DIAGNOSIS — R79.9 ABNORMAL BLOOD CHEMISTRY LEVEL: Primary | ICD-10-CM

## 2021-03-03 NOTE — PROGRESS NOTES
Before sending him to nephrology I want to repeat his labs after he hydrates well in 2 weeks please advise

## 2021-03-03 NOTE — PROGRESS NOTES
Results reviewed. Please call patient with results. BUN and Creatinine are elevated   GFR has decreased   I need him to change his metformin to 500 mg BID not 1000 due to this decrease  A1C is well controlled  Does he have a nephrologist? If so I need him to follow up with them, If not I need to put in a referral for evaluation, please let me know if not.

## 2021-03-04 ENCOUNTER — TELEPHONE (OUTPATIENT)
Dept: INTERNAL MEDICINE CLINIC | Age: 56
End: 2021-03-04

## 2021-11-01 ENCOUNTER — OFFICE VISIT (OUTPATIENT)
Dept: INTERNAL MEDICINE CLINIC | Age: 56
End: 2021-11-01
Payer: COMMERCIAL

## 2021-11-01 VITALS
BODY MASS INDEX: 33.34 KG/M2 | HEIGHT: 68 IN | OXYGEN SATURATION: 100 % | WEIGHT: 220 LBS | DIASTOLIC BLOOD PRESSURE: 86 MMHG | SYSTOLIC BLOOD PRESSURE: 146 MMHG | HEART RATE: 74 BPM | TEMPERATURE: 97.3 F | RESPIRATION RATE: 18 BRPM

## 2021-11-01 DIAGNOSIS — I10 ESSENTIAL HYPERTENSION: Primary | ICD-10-CM

## 2021-11-01 DIAGNOSIS — E11.9 DIABETES MELLITUS, STABLE (HCC): ICD-10-CM

## 2021-11-01 DIAGNOSIS — Z12.11 ENCOUNTER FOR COLORECTAL CANCER SCREENING: ICD-10-CM

## 2021-11-01 DIAGNOSIS — E78.5 HYPERLIPIDEMIA, UNSPECIFIED HYPERLIPIDEMIA TYPE: ICD-10-CM

## 2021-11-01 DIAGNOSIS — Z12.12 ENCOUNTER FOR COLORECTAL CANCER SCREENING: ICD-10-CM

## 2021-11-01 DIAGNOSIS — Z91.199 NON-COMPLIANT PATIENT: ICD-10-CM

## 2021-11-01 DIAGNOSIS — Z76.0 MEDICATION REFILL: ICD-10-CM

## 2021-11-01 PROCEDURE — 99214 OFFICE O/P EST MOD 30 MIN: CPT | Performed by: NURSE PRACTITIONER

## 2021-11-01 RX ORDER — METOPROLOL TARTRATE 25 MG/1
25 TABLET, FILM COATED ORAL DAILY
Qty: 90 TABLET | Refills: 1 | Status: SHIPPED | OUTPATIENT
Start: 2021-11-01

## 2021-11-01 RX ORDER — METFORMIN HYDROCHLORIDE 500 MG/1
TABLET ORAL
Qty: 180 TABLET | Refills: 0 | Status: SHIPPED | OUTPATIENT
Start: 2021-11-01 | End: 2022-04-19

## 2021-11-01 NOTE — PROGRESS NOTES
ROOM # 1  Identified pt with two pt identifiers(name and ). Reviewed record in preparation for visit and have obtained necessary documentation. Chief Complaint   Patient presents with    Hypertension    Diabetes      Noreen Wood preferred language for health care discussion is english/other. Is the patient using any DME equipment during OV? NO    Noreen Wood is due for:  Health Maintenance Due   Topic    Eye Exam Retinal or Dilated     COVID-19 Vaccine (1)    Shingrix Vaccine Age 50> (1 of 2)    Foot Exam Q1     Colorectal Cancer Screening Combo     Lipid Screen      Health Maintenance reviewed and discussed per provider  Please order/place referral if appropriate. Advance Directive:  1. Do you have an advance directive in place? Patient Reply: NO    2. If not, would you like material regarding how to put one in place? NO    Coordination of Care:  1. Have you been to the ER, urgent care clinic since your last visit? Hospitalized since your last visit? NO    2. Have you seen or consulted any other health care providers outside of the [address] [place] [address] since your last visit? Include any pap smears or colon screening. NO    Patient is accompanied by self I have received verbal consent from Noreen Israel to discuss any/all medical information while they are present in the room.     Learning Assessment:  Learning Assessment 2016   PRIMARY LEARNER Patient   PRIMARY LANGUAGE ENGLISH   LEARNER PREFERENCE PRIMARY DEMONSTRATION     READING   ANSWERED BY patient   RELATIONSHIP SELF     Depression Screening:  3 most recent [facility] Screens 2021 3/1/2021 2020 2020 2018 2018 2018   Little interest or pleasure in doing things Not at all Not at all Not at all Not at all Not at all Not at all Not at all   Feeling down, depressed, irritable, or hopeless Not at all Not at all Not at all Not at all Not at all Not at all Not at all   Total Score PHQ 2 0 0 0 0 0 0 0     Abuse Screening:  Abuse Screening Questionnaire 9/11/2018   Do you ever feel afraid of your partner? N   Are you in a relationship with someone who physically or mentally threatens you? N   Is it safe for you to go home?  Y     Recent Travel Screening and Travel History documentation     Travel Screening      No screening recorded since 10/31/21 0000      Travel History   Travel since 10/01/21     No documented travel since 10/01/21

## 2021-11-01 NOTE — PROGRESS NOTES
HISTORY OF PRESENT ILLNESS  Melody Naylor is a 60 y.o. male. Here for HTN and DM, has not been seen since 03/2021  HPI  1) HTN - Lopressor 25 mg daily ran out a week ago   BP Readings from Last 3 Encounters:   11/01/21 (!) 146/86   03/01/21 (!) 146/81   07/29/20 123/71       2) DM- metformin 500 mg BID   Lab Results   Component Value Date/Time    Hemoglobin A1c 5.7 (H) 03/02/2021 08:57 AM    Hemoglobin A1c 5.6 07/23/2020 08:28 AM    Hemoglobin A1c 5.9 (H) 10/02/2019 11:33 AM     Nephrologist? Never returned for lab work - letter x2 was sent out for patient to contact office regarding lab results from 03/2021    BP (!) 146/86 (BP 1 Location: Left upper arm, BP Patient Position: Sitting)   Pulse 74   Temp 97.3 °F (36.3 °C) (Temporal)   Resp 18   Ht 5' 8\" (1.727 m)   Wt 220 lb (99.8 kg)   SpO2 100%   BMI 33.45 kg/m²   Current Outpatient Medications   Medication Sig Dispense Refill    metoprolol tartrate (LOPRESSOR) 25 mg tablet Take 1 Tablet by mouth daily. 90 Tablet 1    metFORMIN (GLUCOPHAGE) 500 mg tablet take 1 tablet by mouth twice a day after meals 180 Tablet 0    BLOOD-GLUCOSE METER (Zach Paz Veg 149) by Does Not Apply route.  glucose blood VI test strips (ASCENSIA AUTODISC VI, ONE TOUCH ULTRA TEST VI) strip Check fasting sugars before breakfast and dinner. One Touch Ultra mini meter 100 Strip 11    Lancets misc Check fasting before breakfast and dinner. 100 Each 11       Review of Systems   Constitutional: Negative for chills, fever and malaise/fatigue. Eyes: Negative for blurred vision and double vision. Respiratory: Negative for cough, shortness of breath and wheezing. Cardiovascular: Negative for chest pain, palpitations and leg swelling. Genitourinary: Negative for frequency and urgency. Neurological: Negative for dizziness and headaches. Endo/Heme/Allergies: Negative for polydipsia. Physical Exam  Vitals and nursing note reviewed.    Constitutional: General: He is not in acute distress. Appearance: He is obese. He is not ill-appearing. HENT:      Head: Normocephalic. Right Ear: External ear normal.      Left Ear: External ear normal.      Mouth/Throat:      Comments: MASK  Eyes:      General: No scleral icterus. Extraocular Movements: Extraocular movements intact. Conjunctiva/sclera: Conjunctivae normal.      Pupils: Pupils are equal, round, and reactive to light. Cardiovascular:      Rate and Rhythm: Normal rate and regular rhythm. Pulses: Normal pulses. Heart sounds: Normal heart sounds. Pulmonary:      Effort: Pulmonary effort is normal. No respiratory distress. Breath sounds: Normal breath sounds. No wheezing. Musculoskeletal:         General: Normal range of motion. Cervical back: Normal range of motion. Right lower leg: No edema. Left lower leg: No edema. Lymphadenopathy:      Cervical: No cervical adenopathy. Skin:     General: Skin is warm and dry. Findings: No lesion or rash. Neurological:      General: No focal deficit present. Mental Status: He is alert and oriented to person, place, and time. Mental status is at baseline. Psychiatric:         Mood and Affect: Mood normal.         Behavior: Behavior normal.         Thought Content: Thought content normal.         Judgment: Judgment normal.         ASSESSMENT and PLAN  Diagnoses and all orders for this visit:    1. Essential hypertension  -     metoprolol tartrate (LOPRESSOR) 25 mg tablet; Take 1 Tablet by mouth daily. 2. Diabetes mellitus, stable (HCC)  -     metFORMIN (GLUCOPHAGE) 500 mg tablet; take 1 tablet by mouth twice a day after meals    3. Hyperlipidemia, unspecified hyperlipidemia type  -     LIPID PANEL; Future    4. Non-compliant patient    5. Medication refill  -     metoprolol tartrate (LOPRESSOR) 25 mg tablet; Take 1 Tablet by mouth daily.   -     metFORMIN (GLUCOPHAGE) 500 mg tablet; take 1 tablet by mouth twice a day after meals    6. Encounter for colorectal cancer screening  -     REFERRAL TO GASTROENTEROLOGY    DM-well controlled  changed Metformin 500 mg BID due to creatinine last visit   BP - Refill metorpolol   Update lipids, CMP  Referral for colonoscopy     Follow-up and Dispositions    · Return in about 3 months (around 2/1/2022) for HTN, DM.

## 2021-11-02 LAB
CHOLEST SERPL-MCNC: 196 MG/DL (ref 100–199)
HDLC SERPL-MCNC: 36 MG/DL
IMP & REVIEW OF LAB RESULTS: NORMAL
LDLC SERPL CALC-MCNC: 145 MG/DL (ref 0–99)
TRIGL SERPL-MCNC: 82 MG/DL (ref 0–149)
VLDLC SERPL CALC-MCNC: 15 MG/DL (ref 5–40)

## 2021-11-08 NOTE — PROGRESS NOTES
Results reviewed. Please call patient with results.     LDL cholesterol is high - from a DM standpoint it is recommend to be below 70 - is he willing to start a low dose statin??

## 2021-11-09 NOTE — PROGRESS NOTES
Spoke with patient and 2 patient identifiers was confirmed. Patient was given results below and verbalized understanding . Patient has no questions/concerns  at this time. Pt wants to work on diet and exercise instead of medication.

## 2022-03-18 PROBLEM — I48.0 PAF (PAROXYSMAL ATRIAL FIBRILLATION) (HCC): Status: ACTIVE | Noted: 2020-08-04

## 2022-03-19 PROBLEM — E11.40 TYPE 2 DIABETES MELLITUS WITH DIABETIC NEUROPATHY (HCC): Status: ACTIVE | Noted: 2019-10-02

## 2022-03-20 PROBLEM — I10 ESSENTIAL HYPERTENSION: Status: ACTIVE | Noted: 2020-08-04

## 2022-03-23 ENCOUNTER — TELEPHONE (OUTPATIENT)
Dept: INTERNAL MEDICINE CLINIC | Age: 57
End: 2022-03-23

## 2022-04-18 DIAGNOSIS — E11.9 DIABETES MELLITUS, STABLE (HCC): ICD-10-CM

## 2022-04-18 DIAGNOSIS — Z76.0 MEDICATION REFILL: ICD-10-CM

## 2022-04-19 RX ORDER — METFORMIN HYDROCHLORIDE 500 MG/1
TABLET ORAL
Qty: 180 TABLET | Refills: 0 | Status: SHIPPED | OUTPATIENT
Start: 2022-04-19 | End: 2022-04-26 | Stop reason: SDUPTHER

## 2022-06-10 ENCOUNTER — DOCUMENTATION ONLY (OUTPATIENT)
Dept: INTERNAL MEDICINE CLINIC | Age: 57
End: 2022-06-10

## 2022-12-28 ENCOUNTER — OFFICE VISIT (OUTPATIENT)
Dept: INTERNAL MEDICINE CLINIC | Age: 57
End: 2022-12-28
Payer: COMMERCIAL

## 2022-12-28 VITALS
OXYGEN SATURATION: 100 % | DIASTOLIC BLOOD PRESSURE: 83 MMHG | WEIGHT: 219 LBS | BODY MASS INDEX: 33.19 KG/M2 | SYSTOLIC BLOOD PRESSURE: 174 MMHG | HEART RATE: 70 BPM | HEIGHT: 68 IN | RESPIRATION RATE: 18 BRPM

## 2022-12-28 DIAGNOSIS — Z76.0 MEDICATION REFILL: ICD-10-CM

## 2022-12-28 DIAGNOSIS — E78.5 HYPERLIPIDEMIA, UNSPECIFIED HYPERLIPIDEMIA TYPE: ICD-10-CM

## 2022-12-28 DIAGNOSIS — L97.521 DIABETIC ULCER OF LEFT FOOT ASSOCIATED WITH DIABETES MELLITUS DUE TO UNDERLYING CONDITION, LIMITED TO BREAKDOWN OF SKIN, UNSPECIFIED PART OF FOOT (HCC): ICD-10-CM

## 2022-12-28 DIAGNOSIS — I10 ESSENTIAL HYPERTENSION: ICD-10-CM

## 2022-12-28 DIAGNOSIS — E11.9 DIABETES MELLITUS, STABLE (HCC): Primary | ICD-10-CM

## 2022-12-28 DIAGNOSIS — E08.621 DIABETIC ULCER OF LEFT FOOT ASSOCIATED WITH DIABETES MELLITUS DUE TO UNDERLYING CONDITION, LIMITED TO BREAKDOWN OF SKIN, UNSPECIFIED PART OF FOOT (HCC): ICD-10-CM

## 2022-12-28 PROCEDURE — 3078F DIAST BP <80 MM HG: CPT | Performed by: NURSE PRACTITIONER

## 2022-12-28 PROCEDURE — 99214 OFFICE O/P EST MOD 30 MIN: CPT | Performed by: NURSE PRACTITIONER

## 2022-12-28 PROCEDURE — 3074F SYST BP LT 130 MM HG: CPT | Performed by: NURSE PRACTITIONER

## 2022-12-28 RX ORDER — METOPROLOL TARTRATE 25 MG/1
25 TABLET, FILM COATED ORAL DAILY
Qty: 90 TABLET | Refills: 0 | Status: SHIPPED | OUTPATIENT
Start: 2022-12-28

## 2022-12-28 RX ORDER — METOPROLOL TARTRATE 25 MG/1
25 TABLET, FILM COATED ORAL DAILY
Qty: 90 TABLET | Refills: 1 | Status: SHIPPED | OUTPATIENT
Start: 2022-12-28 | End: 2022-12-28

## 2022-12-28 RX ORDER — AMOXICILLIN AND CLAVULANATE POTASSIUM 500; 125 MG/1; MG/1
TABLET, FILM COATED ORAL
COMMUNITY
Start: 2022-12-21

## 2022-12-28 RX ORDER — METFORMIN HYDROCHLORIDE 500 MG/1
TABLET ORAL
Qty: 180 TABLET | Refills: 1 | Status: SHIPPED | OUTPATIENT
Start: 2022-12-28 | End: 2022-12-28

## 2022-12-28 RX ORDER — METFORMIN HYDROCHLORIDE 500 MG/1
TABLET ORAL
Qty: 180 TABLET | Refills: 0 | Status: SHIPPED | OUTPATIENT
Start: 2022-12-28

## 2022-12-28 NOTE — PROGRESS NOTES
ROOM # 1  Identified pt with two pt identifiers(name and ). Reviewed record in preparation for visit and have obtained necessary documentation. Chief Complaint   Patient presents with    Diabetes      Magui Arevalo preferred language for health care discussion is english/other. Is the patient using any DME equipment during OV? [employer] is due for:  Health Maintenance Due   Topic    COVID-19 Vaccine (1)    Pneumococcal 0-64 years (1 - PCV)    Eye Exam Retinal or Dilated     Hepatitis B Vaccine (1 of 3 - Risk 3-dose series)    Shingles Vaccine (1 of 2)    Foot Exam Q1     Diabetic Alb to Cr ratio (uACR) test     GFR test (Diabetes, CKD 3-4, OR last GFR 15-59)     A1C test (Diabetic or Prediabetic)     Flu Vaccine (1)    Depression Screen     Lipid Screen      Health Maintenance reviewed and discussed per provider  Please order/place referral if appropriate. 1. For patients aged 39-70: Has the patient had a colonoscopy? Yes - no Care Gap present   If the patient is female:    2. For patients aged 41-77: Has the patient had a mammogram within the past 2 years? No    3. For patients aged 21-65: Has the patient had a pap smear? No  Advance Directive:  1. Do you have an advance directive in place? Patient Reply: NO    2. If not, would you like material regarding how to put one in place? NO    Coordination of Care:  1. Have you been to the ER, urgent care clinic since your last visit? Hospitalized since your last visit? NO    2. Have you seen or consulted any other health care providers outside of the [address] [place] [address] since your last visit? Include any pap smears or colon screening. NO    Patient is accompanied by self I have received verbal consent from Magui Arevalo to discuss any/all medical information while they are present in the room.     Learning Assessment:  Learning Assessment 2016   PRIMARY LEARNER Patient   PRIMARY LANGUAGE ENGLISH   LEARNER PREFERENCE PRIMARY DEMONSTRATION READING   ANSWERED BY patient   RELATIONSHIP SELF     Depression Screening:  [facility] recent [facility] Screens 12/28/2022 11/1/2021 3/1/2021 7/29/2020 7/22/2020 9/11/2018 9/11/2018   Little interest or pleasure in doing things Not at all Not at all Not at all Not at all Not at all Not at all Not at all   Feeling down, depressed, irritable, or hopeless Not at all Not at all Not at all Not at all Not at all Not at all Not at all   Total Score PHQ 2 0 0 0 0 0 0 0     Abuse Screening:  Abuse Screening Questionnaire 9/11/2018   Do you ever feel afraid of your partner? N   Are you in a relationship with someone who physically or mentally threatens you? N   Is it safe for you to go home?  Y       Recent Travel Screening and Travel History documentation     Travel Screening     No screening recorded since 12/27/22 0000       Travel History   Travel since 11/28/22    No documented travel since 11/28/22

## 2022-12-28 NOTE — PROGRESS NOTES
HISTORY OF PRESENT ILLNESS  Ashley Meckel is a 59 y.o. male. Here for HTN and DM   HPI  1) HTN - Lopressor 25 mg daily ran out of meds more than a month ago   BP Readings from Last 3 Encounters:   12/28/22 (!) 174/83   11/01/21 (!) 146/86   03/01/21 (!) 146/81         2) DM- metformin 500 mg BID ran out of meds more than a month ago - patient would like to abstain from starting a statin   Lab Results   Component Value Date/Time    Hemoglobin A1c 5.7 (H) 03/02/2021 08:57 AM    Hemoglobin A1c 5.6 07/23/2020 08:28 AM    Hemoglobin A1c 5.9 (H) 10/02/2019 11:33 AM        3) Left foot infection x 2 DM ulcer - following podiatry for ongoing care - would like referral to wound clinic as the healing process seems slow per patient - picture on patients phone round wound on ball of left foot - approximately 3 cm wide     BP (!) 174/83 (BP 1 Location: Right arm, BP Patient Position: Sitting)   Pulse 70   Resp 18   Ht 5' 8\" (1.727 m)   Wt 219 lb (99.3 kg)   SpO2 100%   BMI 33.30 kg/m²   Current Outpatient Medications   Medication Sig Dispense Refill    metFORMIN (GLUCOPHAGE) 500 mg tablet take 1 tablet by mouth twice a day after meals 180 Tablet 0    metoprolol tartrate (LOPRESSOR) 25 mg tablet Take 1 Tablet by mouth daily. 90 Tablet 0    BLOOD-GLUCOSE METER (Zach Paz Veg 149) by Does Not Apply route. glucose blood VI test strips (ASCENSIA AUTODISC VI, ONE TOUCH ULTRA TEST VI) strip Check fasting sugars before breakfast and dinner. One Touch Ultra mini meter 100 Strip 11    Lancets misc Check fasting before breakfast and dinner. 100 Each 11    amoxicillin-clavulanate (AUGMENTIN) 500-125 mg per tablet take 1 tablet by mouth every 8 hours for 15 DAYS         Review of Systems   Constitutional:  Negative for chills, fever and malaise/fatigue. Respiratory:  Negative for cough, shortness of breath and wheezing. Cardiovascular:  Negative for chest pain and palpitations.    Musculoskeletal:         Left foot ulcer        Physical Exam  Vitals and nursing note reviewed. Constitutional:       General: He is not in acute distress. Appearance: He is obese. He is not ill-appearing. HENT:      Head: Normocephalic. Right Ear: External ear normal.      Left Ear: External ear normal.   Eyes:      General: No scleral icterus. Conjunctiva/sclera: Conjunctivae normal.   Cardiovascular:      Rate and Rhythm: Normal rate and regular rhythm. Pulses: Normal pulses. Heart sounds: Normal heart sounds. Pulmonary:      Effort: Pulmonary effort is normal. No respiratory distress. Breath sounds: Normal breath sounds. No wheezing. Musculoskeletal:      Cervical back: Normal range of motion. Comments: Soft boot on left foot    Skin:     General: Skin is warm and dry. Neurological:      General: No focal deficit present. Mental Status: He is alert and oriented to person, place, and time. Mental status is at baseline. Psychiatric:         Mood and Affect: Mood normal.         Behavior: Behavior normal.         Thought Content: Thought content normal.         Judgment: Judgment normal.   Pt declines wound exam - showed me a picture instead     ASSESSMENT and PLAN  Diagnoses and all orders for this visit:    1. Diabetes mellitus, stable (HCC)  -     metFORMIN (GLUCOPHAGE) 500 mg tablet; take 1 tablet by mouth twice a day after meals  -     HEMOGLOBIN A1C WITH EAG; Future  -     MICROALBUMIN, UR, RAND W/ MICROALB/CREAT RATIO; Future    2. Essential hypertension  -     metoprolol tartrate (LOPRESSOR) 25 mg tablet; Take 1 Tablet by mouth daily.  -     METABOLIC PANEL, COMPREHENSIVE; Future    3. Diabetic ulcer of left foot associated with diabetes mellitus due to underlying condition, limited to breakdown of skin, unspecified part of foot ([address]  -     REFERRAL TO WOUND CARE    4. Hyperlipidemia, unspecified hyperlipidemia type  -     LIPID PANEL; Future    5.  Medication refill  -     metFORMIN (GLUCOPHAGE) 500 mg tablet; take 1 tablet by mouth twice a day after meals  -     metoprolol tartrate (LOPRESSOR) 25 mg tablet; Take 1 Tablet by mouth daily. DM - update A1C - restart metformin BID  HTN - restart medications at current dose   HLD - update lipids   Diabetic foot ulcer - referral to wound care for evaluation and treatment - recommended patient keep close follow up with podiatry   Follow-up and Dispositions    Return in about 3 months (around 3/28/2023) for HTN, CHOL, DM new PCP.

## 2022-12-29 LAB
ALBUMIN SERPL-MCNC: 4.5 G/DL (ref 3.8–4.9)
ALBUMIN/CREAT UR: 117 MG/G CREAT (ref 0–29)
ALBUMIN/GLOB SERPL: 1.4 {RATIO} (ref 1.2–2.2)
ALP SERPL-CCNC: 140 IU/L (ref 44–121)
ALT SERPL-CCNC: 16 IU/L (ref 0–44)
AST SERPL-CCNC: 16 IU/L (ref 0–40)
BILIRUB SERPL-MCNC: 0.5 MG/DL (ref 0–1.2)
BUN SERPL-MCNC: 22 MG/DL (ref 6–24)
BUN/CREAT SERPL: 18 (ref 9–20)
CALCIUM SERPL-MCNC: 9.5 MG/DL (ref 8.7–10.2)
CHLORIDE SERPL-SCNC: 103 MMOL/L (ref 96–106)
CHOLEST SERPL-MCNC: 200 MG/DL (ref 100–199)
CO2 SERPL-SCNC: 20 MMOL/L (ref 20–29)
CREAT SERPL-MCNC: 1.22 MG/DL (ref 0.76–1.27)
CREAT UR-MCNC: 10.9 MG/DL
EGFR: 68 ML/MIN/1.73
EST. AVERAGE GLUCOSE BLD GHB EST-MCNC: 151 MG/DL
GLOBULIN SER CALC-MCNC: 3.2 G/DL (ref 1.5–4.5)
GLUCOSE SERPL-MCNC: 128 MG/DL (ref 70–99)
HBA1C MFR BLD: 6.9 % (ref 4.8–5.6)
HDLC SERPL-MCNC: 39 MG/DL
IMP & REVIEW OF LAB RESULTS: NORMAL
LDLC SERPL CALC-MCNC: 149 MG/DL (ref 0–99)
MICROALBUMIN UR-MCNC: 12.7 UG/ML
POTASSIUM SERPL-SCNC: 4.7 MMOL/L (ref 3.5–5.2)
PROT SERPL-MCNC: 7.7 G/DL (ref 6–8.5)
SODIUM SERPL-SCNC: 141 MMOL/L (ref 134–144)
TRIGL SERPL-MCNC: 64 MG/DL (ref 0–149)
VLDLC SERPL CALC-MCNC: 12 MG/DL (ref 5–40)

## 2023-01-09 ENCOUNTER — HOSPITAL ENCOUNTER (OUTPATIENT)
Dept: LAB | Age: 58
Discharge: HOME | End: 2023-01-09

## 2023-01-09 LAB — XX-LABCORP SPECIMEN COL,LCBCF: NORMAL

## 2023-01-09 PROCEDURE — 99001 SPECIMEN HANDLING PT-LAB: CPT

## 2023-03-27 ENCOUNTER — OFFICE VISIT (OUTPATIENT)
Facility: CLINIC | Age: 58
End: 2023-03-27
Payer: COMMERCIAL

## 2023-03-27 VITALS
HEIGHT: 68 IN | OXYGEN SATURATION: 100 % | WEIGHT: 232.2 LBS | SYSTOLIC BLOOD PRESSURE: 144 MMHG | RESPIRATION RATE: 16 BRPM | TEMPERATURE: 96.4 F | HEART RATE: 65 BPM | DIASTOLIC BLOOD PRESSURE: 69 MMHG | BODY MASS INDEX: 35.19 KG/M2

## 2023-03-27 DIAGNOSIS — R80.9 MICROALBUMINURIA: ICD-10-CM

## 2023-03-27 DIAGNOSIS — Z13.29 THYROID DISORDER SCREEN: ICD-10-CM

## 2023-03-27 DIAGNOSIS — Z90.5 SOLITARY KIDNEY, ACQUIRED: ICD-10-CM

## 2023-03-27 DIAGNOSIS — Z76.89 ESTABLISHING CARE WITH NEW DOCTOR, ENCOUNTER FOR: Primary | ICD-10-CM

## 2023-03-27 DIAGNOSIS — E78.5 HYPERLIPIDEMIA, UNSPECIFIED HYPERLIPIDEMIA TYPE: ICD-10-CM

## 2023-03-27 DIAGNOSIS — E11.69 TYPE 2 DIABETES MELLITUS WITH OTHER SPECIFIED COMPLICATION, WITHOUT LONG-TERM CURRENT USE OF INSULIN (HCC): ICD-10-CM

## 2023-03-27 DIAGNOSIS — I10 ESSENTIAL HYPERTENSION: ICD-10-CM

## 2023-03-27 PROCEDURE — 3077F SYST BP >= 140 MM HG: CPT | Performed by: INTERNAL MEDICINE

## 2023-03-27 PROCEDURE — 3078F DIAST BP <80 MM HG: CPT | Performed by: INTERNAL MEDICINE

## 2023-03-27 PROCEDURE — 99214 OFFICE O/P EST MOD 30 MIN: CPT | Performed by: INTERNAL MEDICINE

## 2023-03-27 RX ORDER — ROSUVASTATIN CALCIUM 10 MG/1
10 TABLET, COATED ORAL DAILY
Qty: 90 TABLET | Refills: 1 | Status: SHIPPED | OUTPATIENT
Start: 2023-03-27

## 2023-03-27 SDOH — ECONOMIC STABILITY: HOUSING INSECURITY
IN THE LAST 12 MONTHS, WAS THERE A TIME WHEN YOU DID NOT HAVE A STEADY PLACE TO SLEEP OR SLEPT IN A SHELTER (INCLUDING NOW)?: NO

## 2023-03-27 SDOH — ECONOMIC STABILITY: FOOD INSECURITY: WITHIN THE PAST 12 MONTHS, YOU WORRIED THAT YOUR FOOD WOULD RUN OUT BEFORE YOU GOT MONEY TO BUY MORE.: NEVER TRUE

## 2023-03-27 SDOH — ECONOMIC STABILITY: INCOME INSECURITY: HOW HARD IS IT FOR YOU TO PAY FOR THE VERY BASICS LIKE FOOD, HOUSING, MEDICAL CARE, AND HEATING?: NOT HARD AT ALL

## 2023-03-27 SDOH — ECONOMIC STABILITY: FOOD INSECURITY: WITHIN THE PAST 12 MONTHS, THE FOOD YOU BOUGHT JUST DIDN'T LAST AND YOU DIDN'T HAVE MONEY TO GET MORE.: NEVER TRUE

## 2023-03-27 ASSESSMENT — ANXIETY QUESTIONNAIRES
1. FEELING NERVOUS, ANXIOUS, OR ON EDGE: 0
4. TROUBLE RELAXING: 0
6. BECOMING EASILY ANNOYED OR IRRITABLE: 0
2. NOT BEING ABLE TO STOP OR CONTROL WORRYING: 0
3. WORRYING TOO MUCH ABOUT DIFFERENT THINGS: 0
IF YOU CHECKED OFF ANY PROBLEMS ON THIS QUESTIONNAIRE, HOW DIFFICULT HAVE THESE PROBLEMS MADE IT FOR YOU TO DO YOUR WORK, TAKE CARE OF THINGS AT HOME, OR GET ALONG WITH OTHER PEOPLE: NOT DIFFICULT AT ALL
7. FEELING AFRAID AS IF SOMETHING AWFUL MIGHT HAPPEN: 0
5. BEING SO RESTLESS THAT IT IS HARD TO SIT STILL: 0
GAD7 TOTAL SCORE: 0

## 2023-03-27 ASSESSMENT — ENCOUNTER SYMPTOMS
COUGH: 0
DIARRHEA: 0
SHORTNESS OF BREATH: 0
ABDOMINAL PAIN: 0
CONSTIPATION: 0

## 2023-03-27 ASSESSMENT — PATIENT HEALTH QUESTIONNAIRE - PHQ9
SUM OF ALL RESPONSES TO PHQ QUESTIONS 1-9: 0
1. LITTLE INTEREST OR PLEASURE IN DOING THINGS: 0
SUM OF ALL RESPONSES TO PHQ QUESTIONS 1-9: 0
2. FEELING DOWN, DEPRESSED OR HOPELESS: 0
SUM OF ALL RESPONSES TO PHQ QUESTIONS 1-9: 0
SUM OF ALL RESPONSES TO PHQ9 QUESTIONS 1 & 2: 0
SUM OF ALL RESPONSES TO PHQ QUESTIONS 1-9: 0

## 2023-03-27 NOTE — PROGRESS NOTES
Nery Jimenez is a 59 y.o.  male presents today for office visit for   Chief Complaint   Patient presents with    Establish Care    Hypertension    Diabetes   . 1. \"Have you been to the ER, urgent care clinic since your last visit? Hospitalized since your last visit? \"  No    2. \"Have you seen or consulted any other health care providers outside of the [address] [place] [address] since your last visit? \" Yes- Dr. Edith Garcia (podiatry) diabetic foot ulcer on L foot. HEALED     3. For patients aged 39-70: Has the patient had a colonoscopy / FIT/ Cologuard? Yes     If the patient is female:    4. For patients aged 41-77: Has the patient had a mammogram within the past 2 years? N/A    5. For patients aged 21-65: Has the patient had a pap smear?  N/A
Nephrology     metoprolol tartrate (LOPRESSOR) 25 MG tablet     Comprehensive Metabolic Panel     Comprehensive Metabolic Panel      5. Microalbuminuria  R80.9 External Referral To Nephrology      6. Hyperlipidemia, unspecified hyperlipidemia type  E78.5 rosuvastatin (CRESTOR) 10 MG tablet     Lipid Panel     Lipid Panel      7. Thyroid disorder screen  Z13.29 TSH + Free T4 Panel     TSH + Free T4 Panel              Plan:   Return in about 1 month (around 4/27/2023) for Follow up, Treatment monitoring. Started statins, side effects and benefits allergic reactions disclosed, if any present he will stop medication and go to ED. Blood work ordered. Medication refilled. Blood pressure log sheet provided, will check twice a day and come back in a month for further adjustments if needed. Referral to nephrologist, contact information provided, solitary kidney, microalbuminuria, diabetes type 2, hypertension.   Kunal Brown MD

## 2023-03-28 LAB
A/G RATIO: 1.5 RATIO (ref 1.1–2.6)
ALBUMIN SERPL-MCNC: 4.4 G/DL (ref 3.5–5)
ALP BLD-CCNC: 129 U/L (ref 40–125)
ALT SERPL-CCNC: 15 U/L (ref 5–40)
ANION GAP SERPL CALCULATED.3IONS-SCNC: 13 MMOL/L (ref 3–15)
AST SERPL-CCNC: 17 U/L (ref 10–37)
BILIRUB SERPL-MCNC: 0.5 MG/DL (ref 0.2–1.2)
BUN BLDV-MCNC: 18 MG/DL (ref 6–22)
CALCIUM SERPL-MCNC: 9.4 MG/DL (ref 8.4–10.5)
CHLORIDE BLD-SCNC: 102 MMOL/L (ref 98–110)
CHOLESTEROL/HDL RATIO: 5.1 (ref 0–5)
CHOLESTEROL: 177 MG/DL (ref 110–200)
CO2: 25 MMOL/L (ref 20–32)
CREAT SERPL-MCNC: 1.2 MG/DL (ref 0.8–1.6)
GLOBULIN: 2.9 G/DL (ref 2–4)
GLOMERULAR FILTRATION RATE: >60 ML/MIN/1.73 SQ.M.
GLUCOSE: 109 MG/DL (ref 70–99)
HDLC SERPL-MCNC: 35 MG/DL
LDL CHOLESTEROL CALCULATED: 125 MG/DL (ref 50–99)
LDL/HDL RATIO: 3.6
NON-HDL CHOLESTEROL: 142 MG/DL
POTASSIUM SERPL-SCNC: 4.8 MMOL/L (ref 3.5–5.5)
SODIUM BLD-SCNC: 140 MMOL/L (ref 133–145)
T4 FREE: 1.4 NG/DL (ref 0.9–1.8)
TOTAL PROTEIN: 7.3 G/DL (ref 6.2–8.1)
TRIGL SERPL-MCNC: 87 MG/DL (ref 40–149)
TSH SERPL DL<=0.05 MIU/L-ACNC: 1.72 MCU/ML (ref 0.27–4.2)
VLDLC SERPL CALC-MCNC: 17 MG/DL (ref 8–30)

## 2024-01-27 DIAGNOSIS — E78.5 HYPERLIPIDEMIA, UNSPECIFIED HYPERLIPIDEMIA TYPE: ICD-10-CM

## 2024-01-27 DIAGNOSIS — E11.69 TYPE 2 DIABETES MELLITUS WITH OTHER SPECIFIED COMPLICATION, WITHOUT LONG-TERM CURRENT USE OF INSULIN (HCC): ICD-10-CM

## 2024-01-30 RX ORDER — ROSUVASTATIN CALCIUM 10 MG/1
10 TABLET, COATED ORAL DAILY
Qty: 90 TABLET | Refills: 1 | Status: SHIPPED | OUTPATIENT
Start: 2024-01-30